# Patient Record
Sex: MALE | Race: WHITE
[De-identification: names, ages, dates, MRNs, and addresses within clinical notes are randomized per-mention and may not be internally consistent; named-entity substitution may affect disease eponyms.]

---

## 2017-05-28 ENCOUNTER — HOSPITAL ENCOUNTER (EMERGENCY)
Dept: HOSPITAL 80 - CED | Age: 72
Discharge: LEFT BEFORE DISCHARGE | End: 2017-05-28
Payer: COMMERCIAL

## 2017-05-28 DIAGNOSIS — Z53.21: Primary | ICD-10-CM

## 2018-11-06 ENCOUNTER — HOSPITAL ENCOUNTER (INPATIENT)
Dept: HOSPITAL 80 - FED | Age: 73
LOS: 14 days | Discharge: NURSING, REHAB OR LONG-TERM CARE | DRG: 216 | End: 2018-11-20
Attending: THORACIC SURGERY (CARDIOTHORACIC VASCULAR SURGERY) | Admitting: FAMILY MEDICINE
Payer: COMMERCIAL

## 2018-11-06 DIAGNOSIS — R78.81: ICD-10-CM

## 2018-11-06 DIAGNOSIS — I25.10: ICD-10-CM

## 2018-11-06 DIAGNOSIS — D69.59: ICD-10-CM

## 2018-11-06 DIAGNOSIS — B37.0: ICD-10-CM

## 2018-11-06 DIAGNOSIS — G06.1: ICD-10-CM

## 2018-11-06 DIAGNOSIS — M32.13: ICD-10-CM

## 2018-11-06 DIAGNOSIS — I34.0: ICD-10-CM

## 2018-11-06 DIAGNOSIS — M60.08: ICD-10-CM

## 2018-11-06 DIAGNOSIS — I74.8: ICD-10-CM

## 2018-11-06 DIAGNOSIS — E78.5: ICD-10-CM

## 2018-11-06 DIAGNOSIS — I10: ICD-10-CM

## 2018-11-06 DIAGNOSIS — I76: ICD-10-CM

## 2018-11-06 DIAGNOSIS — Z79.899: ICD-10-CM

## 2018-11-06 DIAGNOSIS — I33.0: Primary | ICD-10-CM

## 2018-11-06 DIAGNOSIS — E87.1: ICD-10-CM

## 2018-11-06 DIAGNOSIS — B95.4: ICD-10-CM

## 2018-11-06 DIAGNOSIS — D62: ICD-10-CM

## 2018-11-06 LAB — PLATELET # BLD: 103 10^3/UL (ref 150–400)

## 2018-11-06 PROCEDURE — P9017 PLASMA 1 DONOR FRZ W/IN 8 HR: HCPCS

## 2018-11-06 PROCEDURE — C1751 CATH, INF, PER/CENT/MIDLINE: HCPCS

## 2018-11-06 PROCEDURE — P9016 RBC LEUKOCYTES REDUCED: HCPCS

## 2018-11-06 PROCEDURE — C1768 GRAFT, VASCULAR: HCPCS

## 2018-11-06 PROCEDURE — 009U3ZX DRAINAGE OF SPINAL CANAL, PERCUTANEOUS APPROACH, DIAGNOSTIC: ICD-10-PCS | Performed by: EMERGENCY MEDICINE

## 2018-11-06 PROCEDURE — A9585 GADOBUTROL INJECTION: HCPCS

## 2018-11-06 PROCEDURE — P9035 PLATELET PHERES LEUKOREDUCED: HCPCS

## 2018-11-06 PROCEDURE — C1769 GUIDE WIRE: HCPCS

## 2018-11-06 PROCEDURE — P9041 ALBUMIN (HUMAN),5%, 50ML: HCPCS

## 2018-11-06 RX ADMIN — DOXYCYCLINE HYCLATE SCH MG: 100 CAPSULE, GELATIN COATED ORAL at 22:30

## 2018-11-06 RX ADMIN — ACETAMINOPHEN PRN MG: 325 TABLET ORAL at 21:56

## 2018-11-06 NOTE — GHP
DATE OF ADMISSION:  11/06/2018



CHIEF COMPLAINT:  Neck pain.



HISTORY OF PRESENT ILLNESS:  This is a 73-year-old male with history of lupus, on Plaquenil and mycop
henolate, who was referred to the emergency department by his orthopedic surgeon after being evaluate
d for neck pain earlier today.  Patient states that he has been having worsening neck pain over the p
ast week.  He went to see his orthopedic surgeon, where he had x-rays done that did not reveal a caus
e for his pain.  He then developed some pretty significant chills and was sent to the emergency depar
tment for further evaluation.  During the time of my exam, he denies any headache.  He denies any fev
ers.  He does feel chilled.  Denies any new numbness, weakness.



PAST MEDICAL HISTORY:  

1.  SLE.  

2.  Possible Lyme disease.  

3.  Hypertension.



PAST SURGICAL HISTORY:  Hamstring repair in 2014, right shoulder surgery.



HOME MEDICATIONS:  Amlodipine, ramipril, Plaquenil, Lipitor, doxycycline, aspirin, mycophenolate.



ALLERGIES:  Contrast dye.



SOCIAL HISTORY:  The patient is a  for [employer].  Denies any alcohol, tobacco, or illicit drug use.




FAMILY HISTORY:  Reviewed and noncontributory.



REVIEW OF SYSTEMS:  Comprehensive 10-point review of systems was done and is negative, except as ment
ioned in HPI.



PHYSICAL EXAM:  VITAL SIGNS:  Blood pressure 121/70; pulse of 84; respiratory rate 18; O2 sat 88% on 
room air, 94% on 2 L.  GENERAL:  No acute distress.  HEAD:  Normocephalic, atraumatic.  NECK:  Rigid.
  CARDIOVASCULAR:  S1, S2.  No murmurs, rubs, clicks, gallops.  No JVD.  No lower extremity edema.  P
ULMONARY:  Lungs are clear.  No wheezes, rales, or rhonchi.  ABDOMEN:  Soft, nontender, nondistended.
  No guarding or rebound tenderness.  Normoactive bowel sounds.  EXTREMITIES:  No clubbing or cyanosi
s.  NEURO:  Cranial nerves 2 through 12 grossly intact.  No focal motor or sensory deficits.  SKIN:  
There is hyperpigmentation of the legs attributed to Plaquenil use.



DIAGNOSTICS:  WBC 10.76, hemoglobin 11.7, hematocrit 33.7, platelets 103.  Sodium 132, potassium 4.5,
 chloride 96, BUN 16, creatinine 0.6, glucose 175.  Influenza was negative.  CSF was reviewed showing
 19 WBCs 23% neutrophils.  Protein was high at 68% with a glucose of 71.



ASSESSMENT AND PLAN:  

1.  This is a 73-year-old immunosuppressed male on mycophenolate and Plaquenil presenting with neck s
tiffness, most likely due to aseptic meningitis.  

a.  Plan:  The patient will be admitted to the medical-surgical floor.  I discussed the case with Dr. Waters in the emergency department, who discussed the case with Dr. Carmelo Nieto of Infectious Disease, 
who recommended ampicillin, ceftriaxone, and vancomycin, which will be continued until his cultures a
re resulted.  

2.  History of systemic lupus erythematosus.  

3.  History of hypertension.  

a.  Plan is to continue home medications.  

4.  Mild anemia, some signs of bleeding.  

5.  Mild hyponatremia.  

a.  Plan:  Will continue to monitor serum sodium.  

6.  Patient will be admitted to the hospital under inpatient status.





Job #:  [id number]

## 2018-11-06 NOTE — EDPHY
H & P


Stated Complaint: Lower back pain


Time Seen by Provider: 11/06/18 16:44


HPI/ROS: 





Chief Complaint:  Neck pain, back pain, fever





HPI:  73-year-old male with a history of lupus on multiple immunosuppressants 

including cellcept and plaquenil is presenting with neck pain stiffness and 

some subjective chills at home.  He has a history of pinched nerves is neck is 

initially thought this was this.  He has had the symptoms for couple weeks.  

The last 3 days he has had worsening stiffness in his neck and back.  This is 

not similar to prior lupus flares.  He was seen in his orthopedist office who 

sent him in for concerns about a possible meningitis.  X-rays at orthopedist 

were negative.  He has been neurologically intact.  Patient states he did not 

feel well enough to go to work today which is very unusual.  Mild headache.  No 

nausea or vomiting.





ROS:  10 systems were reviewed and were negative except those elements noted in 

the HPI.





PMH:  SLE, on multiple immunosuppressants including Cellcept and Plaquenil





Social History: No smoking, no alcohol,  no recreational drug use





Family History: non-contributory





Physical Exam:


Gen: Awake, Alert, No Distress, warm to touch


HEENT:  


     Nose: no rhinorrhea


     Eyes: PERRLA, EOMI


     Mouth: Moist mucosa 


Neck:  Stiff neck, positive meningismus, no JVD


Chest: nontender, lungs clear to auscultation


Heart: S1, S2 normal, no murmur


Abd: Soft, non-tender, no guarding


Back: no CVA tenderness, no midline tenderness 


Ext: no edema, non-tender


Skin: no rash


Neuro: CN II-XII intact, Sensation grossly intact, Strength 5/5 in bilateral 

upper and lower extremities








- Personal History


Current Tetanus/Diphtheria Vaccine: Yes





- Medical/Surgical History


Hx Asthma: No


Hx Chronic Respiratory Disease: Yes


Hx Diabetes: No


Hx Cardiac Disease: Yes


Hx Renal Disease: No


Hx Cirrhosis: No


Hx Alcoholism: No


Hx HIV/AIDS: No


Hx Splenectomy or Spleen Trauma: No


Other PMH: lupus in lungs,mitral valve slipage, DVT. ortho surgery, LLE tendon 

repair, R rotator cuff surgery 07/07/2015





- Social History


Smoking Status: Never smoked


Constitutional: 


 Initial Vital Signs











Temperature (C)  37.6 C   11/06/18 16:35


 


Heart Rate  98   11/06/18 16:35


 


Respiratory Rate  18   11/06/18 16:35


 


Blood Pressure  130/84 H  11/06/18 16:35


 


O2 Sat (%)  95   11/06/18 16:35








 











O2 Delivery Mode               Room Air














Allergies/Adverse Reactions: 


 





CONTRAST DYE Allergy (Intermediate, Uncoded 11/06/18 16:40)


 Rash








Home Medications: 














 Medication  Instructions  Recorded


 


Amlodipine Besylate  07/04/14


 


Aspirin 81mg (OTC)  07/04/14


 


Doxycycline Calcium  07/04/14


 


Lipitor 40 mg (RX)  07/04/14


 


Plaquenil 200 mg (RX)  07/04/14


 


Ramipril  07/04/14














Medical Decision Making


Procedures: 





Procedure:  Lumbar puncture.





Indication:  Possible meningitis





After verbal informed consent from patient explaining the risks including 

infection, bleeding, and neurologic damage, a lumbar puncture was performed 

after the patient was prepped and draped in the usual fashion.


The back was anesthetized with 1% lidocaine.  Approximately 4 cc of clear fluid 

was obtained.  Opening pressure was not obtained.  There were no complications.


The procedure was performed by myself.


ED Course/Re-evaluation: 





73-year-old with meningismus and immunosuppression.  CSF is concerning.  I have 

discussed with Dr. Nieto, infectious Disease.  He is requesting that the patient 

receive vancomycin 1.5 g q.12 hours, ceftriaxone 2 g q.12 hours, and ampicillin 

2 g q.4 hours.  He is also requesting enterovirus and HSV.  These have been 

ordered by me.  Case discussed with the hospitalist.  Will admit to their 

service for further care.  Patient is awake alert and currently without 

complaint.





- Data Points


Laboratory Results: 


 Laboratory Results





 11/06/18 17:05 





 11/06/18 17:05 





 











  11/06/18 11/06/18 11/06/18





  18:28 17:30 17:05


 


WBC      





    


 


RBC      





    


 


Hgb      





    


 


Hct      





    


 


MCV      





    


 


MCH      





    


 


MCHC      





    


 


RDW      





    


 


Plt Count      





    


 


MPV      





    


 


Neut % (Auto)      





    


 


Lymph % (Auto)      





    


 


Mono % (Auto)      





    


 


Eos % (Auto)      





    


 


Baso % (Auto)      





    


 


Nucleat RBC Rel Count      





    


 


Absolute Neuts (auto)      





    


 


Absolute Lymphs (auto)      





    


 


Absolute Monos (auto)      





    


 


Absolute Eos (auto)      





    


 


Absolute Basos (auto)      





    


 


Absolute Nucleated RBC      





    


 


Immature Gran %      





    


 


Immature Gran #      





    


 


RBC/WBC/PLT Morphology      





    


 


Platelet Estimate      





    


 


Sodium      132 mEq/L L mEq/L





     (135-145) 


 


Potassium      4.5 mEq/L mEq/L





     (3.3-5.0) 


 


Chloride      96 mEq/L L mEq/L





     () 


 


Carbon Dioxide      23 mEq/l mEq/l





     (22-31) 


 


Anion Gap      13 mEq/L mEq/L





     (6-14) 


 


BUN      16 mg/dL mg/dL





     (7-23) 


 


Creatinine      0.6 mg/dL L mg/dL





     (0.7-1.3) 


 


Estimated GFR      > 60 





    


 


Glucose      175 mg/dL H mg/dL





     () 


 


Calcium      8.7 mg/dL mg/dL





     (8.5-10.4) 


 


Fl Pathologist Review    Pending   





    


 


CSF Tube Number    4   





    


 


CSF Appearance    CLEAR   





    (CLEAR)  


 


CSF Color    COLORLESS   





    (COLORLESS)  


 


CSF Supernatant    COLORLESS   





    (COLORLESS)  


 


CSF WBC    19 /mm3 H /mm3  





    (0-5)  


 


CSF RBC    2 /mm3 H /mm3  





    (0-0)  


 


CSF Neutrophils %    23 % H %  





    (0-6)  


 


CSF Lymphocytes %    47 % %  





    (0-100)  


 


CSF Monos/Macrophage %    30 % %  





    (0-45)  


 


CSF Glucose    71 mg/dL mg/dL  





    (50-75)  


 


CSF Total Protein    68 mg/dL H mg/dL  





    (12-60)  


 


Nasal Influenza A PCR  Pending     





    


 


Nasal Influenza B PCR  Pending     





    














  11/06/18





  17:05


 


WBC  10.76 10^3/uL H 10^3/uL





   (3.80-9.50) 


 


RBC  3.48 10^6/uL L 10^6/uL





   (4.40-6.38) 


 


Hgb  11.7 g/dL L g/dL





   (13.7-17.5) 


 


Hct  33.7 % L %





   (40.0-51.0) 


 


MCV  96.8 fL fL





   (81.5-99.8) 


 


MCH  33.6 pg pg





   (27.9-34.1) 


 


MCHC  34.7 g/dL g/dL





   (32.4-36.7) 


 


RDW  12.1 % %





   (11.5-15.2) 


 


Plt Count  103 10^3/uL L 10^3/uL





   (150-400) 


 


MPV  10.3 fL fL





   (8.7-11.7) 


 


Neut % (Auto)  92.3 % H %





   (39.3-74.2) 


 


Lymph % (Auto)  3.1 % L %





   (15.0-45.0) 


 


Mono % (Auto)  4.1 % L %





   (4.5-13.0) 


 


Eos % (Auto)  0.0 % L %





   (0.6-7.6) 


 


Baso % (Auto)  0.1 % L %





   (0.3-1.7) 


 


Nucleat RBC Rel Count  0.0 % %





   (0.0-0.2) 


 


Absolute Neuts (auto)  9.93 10^3/uL H 10^3/uL





   (1.70-6.50) 


 


Absolute Lymphs (auto)  0.33 10^3/uL L 10^3/uL





   (1.00-3.00) 


 


Absolute Monos (auto)  0.44 10^3/uL 10^3/uL





   (0.30-0.80) 


 


Absolute Eos (auto)  0.00 10^3/uL L 10^3/uL





   (0.03-0.40) 


 


Absolute Basos (auto)  0.01 10^3/uL L 10^3/uL





   (0.02-0.10) 


 


Absolute Nucleated RBC  0.00 10^3/uL 10^3/uL





   (0-0.01) 


 


Immature Gran %  0.4 % %





   (0.0-1.1) 


 


Immature Gran #  0.04 10^3/uL 10^3/uL





   (0.00-0.10) 


 


RBC/WBC/PLT Morphology  TNP 





  


 


Platelet Estimate  TNP 





  


 


Sodium  





  


 


Potassium  





  


 


Chloride  





  


 


Carbon Dioxide  





  


 


Anion Gap  





  


 


BUN  





  


 


Creatinine  





  


 


Estimated GFR  





  


 


Glucose  





  


 


Calcium  





  


 


Fl Pathologist Review  





  


 


CSF Tube Number  





  


 


CSF Appearance  





  


 


CSF Color  





  


 


CSF Supernatant  





  


 


CSF WBC  





  


 


CSF RBC  





  


 


CSF Neutrophils %  





  


 


CSF Lymphocytes %  





  


 


CSF Monos/Macrophage %  





  


 


CSF Glucose  





  


 


CSF Total Protein  





  


 


Nasal Influenza A PCR  





  


 


Nasal Influenza B PCR  





  











Microbiology Results: 


 MICROBIOLOGY





11/06/18 17:30   Cerebral Spinal Fluid   Gram Stain - Final





Medications Given: 


 








Discontinued Medications





Sodium Chloride (Ns)  1,000 mls @ 0 mls/hr IV EDNOW ONE; Wide Open


   PRN Reason: Protocol


   Stop: 11/06/18 17:48


   Last Admin: 11/06/18 17:48 Dose:  1,000 mls








Departure





- Departure


Disposition: [facility] Inpatient Acute


Condition: Fair


Instructions:  Bacterial Meningitis (ED)


Referrals: 


MAI SANTIAGO [Primary Care Provider] - As per Instructions

## 2018-11-07 LAB — PLATELET # BLD: 83 10^3/UL (ref 150–400)

## 2018-11-07 RX ADMIN — RAMIPRIL SCH MG: 5 CAPSULE ORAL at 09:30

## 2018-11-07 RX ADMIN — DOXYCYCLINE HYCLATE SCH MG: 100 CAPSULE, GELATIN COATED ORAL at 09:31

## 2018-11-07 RX ADMIN — AMPICILLIN SODIUM SCH MLS: 2 INJECTION, POWDER, FOR SOLUTION INTRAMUSCULAR; INTRAVENOUS at 05:24

## 2018-11-07 RX ADMIN — ASPIRIN SCH MG: 81 TABLET, DELAYED RELEASE ORAL at 09:31

## 2018-11-07 RX ADMIN — ATORVASTATIN CALCIUM SCH MG: 40 TABLET, FILM COATED ORAL at 21:27

## 2018-11-07 RX ADMIN — DOXYCYCLINE HYCLATE SCH MG: 100 CAPSULE, GELATIN COATED ORAL at 21:27

## 2018-11-07 RX ADMIN — AMPICILLIN SODIUM SCH MLS: 2 INJECTION, POWDER, FOR SOLUTION INTRAMUSCULAR; INTRAVENOUS at 10:49

## 2018-11-07 RX ADMIN — ACETAMINOPHEN PRN MG: 325 TABLET ORAL at 05:23

## 2018-11-07 RX ADMIN — AMPICILLIN SODIUM SCH MLS: 2 INJECTION, POWDER, FOR SOLUTION INTRAMUSCULAR; INTRAVENOUS at 02:06

## 2018-11-07 NOTE — PCMIDPN
Assessment/Plan: 


Please see my consultation earlier today for full details.





Assessment/Plan:


* C 1-2 prevertebral abscess with concomitant epidural inflammatory change:  

MRI shows evidence of prevertebral abscess with mild epidural inflammatory 

change.  Likely associated with streptococcal bacteremia.  Will obtain both ENT 

and neurosurgical consultations based on this finding.  Patient with persistent 

left hip and pelvic pain.  Will proceed with MRI of left hip and pelvis to 

assess for other foci of infection in the setting of bacteremia.  Continue 

ceftriaxone 2 g IV q.12 hours.


* CSF pleocytosis:  CSF meningoencephalitis panel at [facility] is negative.  

Suspect pleocytosis likely represents parameningeal inflammatory process from 

prevertebral abscess and anterior epidural inflammatory change.








11/07/18 17:42





Objective: 


 Vital Signs











Temp Pulse Resp BP Pulse Ox


 


 36.6 C   78   18   140/69 H  92 


 


 11/07/18 15:58  11/07/18 15:58  11/07/18 15:58  11/07/18 15:58  11/07/18 15:58








 Laboratory Results





 11/07/18 04:51 





 11/07/18 04:51 





 











 11/06/18 11/07/18 11/08/18





 05:59 05:59 05:59


 


Intake Total  1600 


 


Output Total  325 


 


Balance  1275 














ICD10 Worksheet


Patient Problems: 


 Problems











Problem Status Onset


 


Meningitis Acute

## 2018-11-07 NOTE — GCON
INFECTIOUS DISEASE CONSULTATION



DATE OF CONSULTATION:  11/07/2018



REFERRING PHYSICIAN:  Shahbaz Hooper DO



REASON FOR CONSULTATION:  Meningitis.



HISTORY OF PRESENT ILLNESS:  Patient is a 73-year-old male with a past medical history of lupus on ch
ronic Plaquenil and CellCept who I am asked to see in consultation for meningitis.  Patient describes
 approximately 10 days ago that he was pushing a sled at the [facility] after which he developed neck pain.
  This pain had become progressive and was associated with neck stiffness.  Over the last 2 days, he 
has had concomitant rigors, but does not feel like he has had any fever.  He also has developed left-
sided flank pain.  He has had mild associated headache, but this has not been severe.  Yesterday, he 
was seen by his orthopedic surgeon and recommended further evaluation in the emergency department. 



In the emergency department, he was noted to have a mild leukocytosis and mild thrombocytopenia with 
left shift.  Given his neck pain, a lumbar puncture was performed, which showed 19 white blood cells 
with 23% neutrophils, 47% lymphocytes, 30% monocytes, glucose 71, protein 68 with negative gram stain
.  I was contacted based on his CSF findings and recommended empiric vancomycin, ampicillin, and ceft
riaxone pending further data given his underlying immune suppression.  Blood cultures were obtained a
nd now 1 of 2 sets is showing growth of a streptococcal species, which by BCID is not group A, group 
B, or Streptococcus pneumoniae.  Patient has been chronically on doxycycline for many years related t
o his underlying lupus and prior diagnosis of Lyme disease.  He has not experienced nausea, vomiting,
 or diarrhea.  No recent skin injuries.  No recent dental problems or dental work.  The patient trave
ls frequently on a domestic basis.  Travel has included [place].  Given the above findings, I am now 
asked to assist in his ongoing management.



PAST MEDICAL HISTORY:  Lupus, hypertension, hyperlipidemia, hypercoagulability, Lyme disease.



PAST SURGICAL HISTORY:  Ruptured hamstring repair, rotator cuff surgery.



CURRENT MEDICATIONS:  Vancomycin 1.25 g IV q.8 hours, ampicillin 2 g IV q.4 hours, ceftriaxone 2 g IV
 q.12 hours, Norvasc 5 mg p.o. daily, aspirin 81 mg p.o. daily, Lipitor 40 mg p.o. q.h.s., doxycyclin
e 50 mg p.o. b.i.d., ramipril 5 mg p.o. daily.



ALLERGIES:  CT contrast dye causes rash; patient does not think he has had any difficulty with MRI co
ntrast dye.



SOCIAL HISTORY:  Patient does not smoke.  He drinks alcohol twice weekly.  No drug use.  Travels freq
uently throughout [facility], including [place], [place], [place], [place], [place].  No animal 
exposures.



REVIEW OF SYSTEMS:  Outside that noted in the HPI, the remainder of 10-system review is unremarkable.




FAMILY HISTORY:  Hypertension.



PHYSICAL EXAMINATION:  VITAL SIGNS:  Temperature maximum 37.8, temperature current 36.7, heart rate 8
1, respiratory rate 18, blood pressure 119/64.  GENERAL:  Patient is well nourished, well developed i
n no acute distress.  He appears nontoxic.  HEENT:  There is no scleral icterus, conjunctival injecti
on, or conjunctival petechiae.  Oropharynx shows dry mucous membranes.  Dentition is in fair repair. 
 There is no nasal discharge.  There is no tenderness over the frontal, maxillary, or mastoid area.  
NECK:  There is point tenderness over the cervical spine.  There is tenderness with flexion of the ce
rvical spine.  No lymphadenopathy or palpable thyromegaly.  CHEST:  Clear to auscultation bilaterally
 without adventitious sounds.  The respiratory effort is normal.  CARDIOVASCULAR:  Regular rate and r
hythm with a 2/6 systolic murmur heard throughout.  No gallops or rubs noted.  ABDOMEN:  Soft, nonten
holly, nondistended.  There is no palpable organomegaly.  Bowel sounds are present.  BACK:  There is no
 flank tenderness.  MUSCULOSKELETAL:  There is no tenderness over the left buttock or thigh.  There i
s no edema present.  No cyanosis or clubbing present.  Finger joint deformities present.  SKIN:  Ther
e is violaceous hyperpigmentation bilaterally over the lower extremities which is chronic; no stigmat
a of endocarditis.  Skin is warm and dry to touch.  NEUROLOGIC:  The patient is alert and interacts a
ppropriately with examiner.  Cranial nerves 2-12 are grossly intact.  Sensation is grossly intact.  M
uscle tone and bulk are normal.  LYMPHATICS:  No cervical or supraclavicular nodes.



LABORATORY DATA:  White blood cell count 9.6, hematocrit 30.7, platelets 83, neutrophils 89%.  Serum 
creatinine is 0.7, bicarbonate 23.  CSF showing 19 white blood cells, 2 red blood cells, 23% neutroph
ils, 47% lymphocytes, 30% monocytes, glucose 71, protein 68.  Influenza PCR is negative.  CSF meningo
encephalitis PCR panel was pending at [facility].  CSF Gram stain is negative with culture p
ending.  Blood cultures showing 1 of 2 sets with Streptococcus species not identified as group A, B, 
or pneumococcus by PCR testing.



IMPRESSION:  

1.  Meningitis:  Given the patient's presentation primarily with neck pain and rigors, I have concern
s this may represent a parameningeal inflammatory process, particularly in light of patient's concomi
tant bacteremia.  Patient does not have headache as a prominent symptom as would typically be expecte
d with meningitis.  Given the patient's immunosuppression, he is at risk for meningitis, including op
portunistic pathogens.  Currently, he has a meningoencephalitis panel pending at [facility].
  Given the positive blood culture, suspect other pathogen, such as Listeria or Streptococcus pneumon
iae are unlikely.  Patient will need MRI of the cervical spine to further assessed for process, such 
as diskitis, vertebral osteomyelitis, or epidural abscess.  Viral etiologies or fungi, such as Crypto
coccus or Coccidioides would be in the differential, although I suspect fungal etiology will be unlik
ely.

2.  Bacteremia:  Most likely, this will be either an oropharyngeal strep versus streptococcal species
, such as group C or G.  Will continue ceftriaxone at doses that penetrate cerebrospinal fluid pendin
g further evaluation.

3.  Flank pain:  Unclear etiology.  No real findings on exam.  If this persists, may ultimately need 
further imaging of this area to ensure no evidence of a focal process, such as psoas abscess.

4.  Thrombocytopenia:  No prior lab values to assess if this is related to his underlying lupus or co
mponent of his infectious presentation.

5.  Reported history of Lyme disease:  Discussed briefly with patient that chronic Lyme disease is no
t felt to be a distinct entity.



RECOMMENDATIONS:  

1.  Continue ceftriaxone 2 g IV q.12 hours.

2.  Discontinue vancomycin and ampicillin.

3.  MRI of the cervical spine with IV contrast.

4.  Await CSF meningoencephalitis panel from [facility].

5.  Follow up blood culture identification and susceptibility as available.

6.  If flank pain persists, consider CT scan of abdomen and pelvis versus pelvic MRI to assess for et
iology.

7.  Followup CSF Gram stain and culture as available.

8.  Further diagnostic testing based on above evaluation and clinical course.  



Thank you for this consultation.  We will continue to follow the patient with you.





Job #:  [id number]

## 2018-11-07 NOTE — HOSPPROG
Hospitalist Progress Note


Assessment/Plan: 





74yo M with SLE on immunosuppressants presents with several days of chills and 

worsening neck pain found to have meningitis and bacteremia. 





#Meningitis: Mild pleocytosis with increased neutrophils on CSF, gram stain 

negative for organisms. He is bacteremic as below and we will target treatment 

at that for now. Reports significant neck discomfort that warrants further 

evaluation. Otherwise, neurologically intact. He is at risk for opportunistic 

infections.


   - Meningoencephalitis panel pending, follow up CSF cultures


   - CTX 2g IV q12h


   - MRI cervical spine


   


#Streptococcal bacteremia: Not group A, B, or pneumococcus. Possibly viridans. 

No clear oropharyngeal or GI source. He is not septic. 


   - Antibiotics and imaging as above


   - Consider TTE


   - Will need repeat blood cultures to eval for clearance





#Thrombocytopenia: Suspect medication induced. Not floridly septic to suspect 

consumption. No prior for comparison. Monitor daily.





#Hyponatremia, mild: C/w hypovolemia. IVF and monitor.





#Transaminitis: Likely medication (plaq, MMF) related. No e/o biliary 

obstruction. Trend. 





#SLE: No acute flare. Holding plaquenil, mycophenolate until infection 

controlled but should be restarted soon.





#Chronic resp insufficiency: On baseline 2L. 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 

Followed at [facility]. 





#Pulmonary hypertension: RVSP 35 on 2015 TTE.





#CAD: No stent placement. Cont asa/statin.





#HTN: Home meds.





Dispo: Remain inpatient for management of above with IV antibiotics, further 

diagnostic studies. 


Subjective: Overall feeling ok, still with neck discomfort. Denies oral lesions/

infection, GI issues as of late. No fevers since admission.


Objective: 


 Vital Signs











Temp Pulse Resp BP Pulse Ox


 


 36.6 C   71   14   120/65   88 L


 


 11/07/18 12:00  11/07/18 12:00  11/07/18 12:00  11/07/18 12:00  11/07/18 12:00








 Laboratory Results





 11/07/18 04:51 





 11/07/18 04:51 





 











 11/06/18 11/07/18 11/08/18





 05:59 05:59 05:59


 


Intake Total  1600 


 


Output Total  325 


 


Balance  1275 














- Physical Exam


Constitutional: no apparent distress, appears nourished, not in pain


Eyes: PERRL, anicteric sclera, EOMI


Ears, Nose, Mouth, Throat: moist mucous membranes, hearing normal, ears appear 

normal, no oral mucosal ulcers


Cardiovascular: regular rate and rhythym, no murmur, rub, or gallop, other (

prominent S2)


Respiratory: no respiratory distress, other (occasional dry cough (chronic))


Gastrointestinal: normoactive bowel sounds, soft, non-tender abdomen, no 

palpable masses


Genitourinary: no bladder fullness, no bladder tenderness, no renal bruits


Skin: other (hyperpigmentation of BLE)


Musculoskeletal: full muscle strength, no muscle tenderness, normal joint ROM


Neurologic: AAOx3, sensation intact bilaterally


Psychiatric: interacting appropriately, not anxious, not encephalopathic, 

thought process linear





ICD10 Worksheet


Patient Problems: 


 Problems











Problem Status Onset


 


Meningitis Acute  














- ICD10 Problem Qualifiers


(1) Meningitis

## 2018-11-07 NOTE — ASMTCMCOM
CM Note

 

CM Note                       

Notes:

Pt is a 72 y/o man admitted for meningitis. Pt has a hx of lupus, plaquenil and mycophenolate. ID 

has been consulted. Needs are TBD at this time. CM to follow.







Plan: TBD

 

Date Signed:  11/07/2018 12:15 PM

Electronically Signed By:PATRICE Sheldon

## 2018-11-07 NOTE — GCON
DATE OF CONSULTATION:  11/07/2018



HISTORY OF PRESENT ILLNESS:  The patient is a 73-year-old gentleman with a 
history of lupus, who is on Plaquenil and CellCept.  He was admitted to the 
hospital yesterday for possible meningitis.  He had had 10 days of neck pain 
prior to admission with associated neck stiffness.  He also feels that his 
right ear is slightly full feeling.  We were consulted because of a small C1-C2 
prevertebral abscess found on imaging.  The patient is currently being seen by 
Dr. Carmelo Nieto, and antibiotics have been started.  Cell counts obtained showed 
leukocytosis and mild thrombocytopenia.



PAST MEDICAL HISTORY:  Significant for lupus, hypertension, hyperlipidemia, 
hypercoagulability, and Lyme disease.



PAST SURGICAL HISTORY:  A hamstring repair and rotator cuff surgery.



CURRENT MEDICATIONS:  Include vancomycin, ampicillin, and ceftriaxone as well 
as doxycycline plus is normal outpatient medications.



ALLERGIES:  To CT contrast dye.



SOCIAL HISTORY:  Patient is a nonsmoker.



PHYSICAL EXAMINATION:  GENERAL:  Patient is alert and orientated, in no acute 
distress.  HEAD:  Atraumatic, normocephalic.  EARS:  EACs were clear.  TMs were 
healthy and intact.  NOSE:  Clear.  ORAL CAVITY:  Oropharynx with severe dry 
mouth.  NECK:  Supple without any adenopathy.



IMAGING:  Review of his MRI shows a 3 x 0.5 cm perivertebral abscess at the 
level of C1-C2.  This was reviewed with Dr. Mederos.



ASSESSMENT AND PLAN:  Patient with a small prevertebral abscess at C1-C2.  
Given the size, I feel that antibiotic treatment should be sufficient.  Should 
anything worsen, please contact us.  



This case has been reviewed with Dr. Daquan Mederos.





I evaluated the images personally and the area of concern is very thin.  There 
is not likely a drainable fluid collection.  Given the site and difficulty in 
accessing the area, I feel it best to continue to  treat with IV antibiotics, 
and re-evaluate if there is not response to the treatment.

thank you,

Daquan Mederos MD

Job #:  [id number]

MTDD

## 2018-11-07 NOTE — GCON
NEUROSURGICAL CONSULTATION





CHIEF COMPLAINT:  Neck pain and stiffness.



HISTORY OF PRESENT ILLNESS:  Mr. Godinez is a 73-year-old male with a history of lupus, for which 
he is on chronic Plaquenil and CellCept.  He was working out at the [employer] several days ago when he dev
eloped neck pain.  Thereafter, he developed neck stiffness and rigors.  He was evaluated in the emerg
ency department, and his symptoms were suspicious for meningitis.  An LP was obtained which showed 19
 white cells, and he was started on empiric vancomycin, ampicillin, and ceftriaxone.  The blood cultu
res were subsequently positive for streptococcal species.  An MRI of the cervical spine was obtained.
  This demonstrated some prevertebral soft tissue swelling from the clivus down to C3, and neurosurgi
long consultation was requested.  The patient currently complains of ongoing neck pain and stiffness. 
 He does feel better than he did yesterday.  He denies any upper extremity radicular pain, weakness, 
paresthesias, bowel/bladder problems, or ataxia.



PAST MEDICAL HISTORY:  

1.  Lupus.

2.  Hypertension.

3.  Hyperlipidemia.

4.  Hypercoagulability.

5.  Lung disease.



PAST SURGICAL HISTORY:  Includes: 

1.  Ruptured hamstring repair.

2.  Rotator cuff surgery.



MEDICATIONS:  Prior to admission are ramipril, doxycycline, Lipitor, aspirin, Norvasc.  He is current
ly on vancomycin, ampicillin, and ceftriaxone.



ALLERGIES:  CT contrast dye, which causes a rash.



FAMILY HISTORY:  Patient has no family history of spine problems.



SOCIAL HISTORY:  Patient is  with grown children.  He does not smoke, but does drink alcohol a
pproximately twice weekly.  He denies recreational drug use.



REVIEW OF SYSTEMS:  Negative.



PHYSICAL EXAM:  GENERAL:  The patient is a 73-year-old male lying in bed in a mild amount of distress
.  HEAD, EYES, EARS, NOSE, AND THROAT:  Negative to drainage.  EXTREMITIES:  Pink, warm, and dry.  NE
UROLOGICAL:  Patient is awake, alert, oriented x4.  Pupils equal, round, reactive to light.  Extraocu
lar motions are intact.  There is no evidence of facial droop.  Tongue and uvula are midline.  Spinal
 access muscles are intact.  His motor strength is 5/5 in all muscle groups of his upper and lower ex
tremities bilaterally.  Sensation is grossly intact to light touch in his upper and lower extremities
 bilaterally.  Deep tendon reflexes are 1/4 in the bilateral biceps, triceps, brachioradialis, patell
ar, and Achilles.  There is a negative Randell's with no clonus.



DIAGNOSTIC STUDIES:  An MRI of the cervical spine from [facility] on 11/07/2018,
 shows preservation of the sagittal alignment.  There are moderate multilevel degenerative changes.  
There is some prevertebral soft tissue enhancement and swelling that extends from the anterior portio
n of C4 through C1 and toward the clivus.  This also extends laterally toward the lateral masses of C
1 bilaterally.



IMPRESSION:  This is a 73-year-old male with neck pain, rigors, who has meningitis and a streptococca
l bacteremia.  His MRI shows a prevertebral soft tissue swelling enhancement from C4 through the cliv
us, but he does not have any evidence of an epidural abscess at this point in time.  He is neurologic
ally stable.



PLAN:  All the above discussed in detail with the patient and his wife.  This patient was seen and ex
amined with Dr. Dial.  At this point time, the patient does feel slightly better since he ha
s been on the IV antibiotics.  This prevertebral soft tissue swelling does not appear to involve any 
type of epidural abscess so we will follow him on a conservative basis.  Should he have persistent fe
vers or bacteremia, then we will consider a repeat MRI of the cervical spine with possible surgical i
ntervention.  For now, we will follow him and his progress.  He may benefit from some p.o. muscle rel
axant medications for his ongoing neck stiffness.  Please call with any neurologic neurological pandey
ginette.





Job #:  [id number]

## 2018-11-08 LAB — PLATELET # BLD: 92 10^3/UL (ref 150–400)

## 2018-11-08 RX ADMIN — DOXYCYCLINE HYCLATE SCH MG: 100 CAPSULE, GELATIN COATED ORAL at 07:49

## 2018-11-08 RX ADMIN — ASPIRIN SCH MG: 81 TABLET, DELAYED RELEASE ORAL at 07:50

## 2018-11-08 RX ADMIN — ATORVASTATIN CALCIUM SCH MG: 40 TABLET, FILM COATED ORAL at 21:28

## 2018-11-08 RX ADMIN — ACETAMINOPHEN PRN MG: 325 TABLET ORAL at 05:15

## 2018-11-08 RX ADMIN — DOXYCYCLINE HYCLATE SCH MG: 100 CAPSULE, GELATIN COATED ORAL at 21:28

## 2018-11-08 RX ADMIN — RAMIPRIL SCH MG: 5 CAPSULE ORAL at 07:50

## 2018-11-08 NOTE — ECHO
[email or web address]





[address] [address] 

Main: [phone] 



Fax: 



Transthoracic Echocardiogram 

Name:             TYRELL GREEN                    MR#:

[id number]

Study Date:       2018                           Study Time:

10:09 AM

YOB: 1945                           Age:

73 year(s)

Height:           175.3 cm (69 in.)                    Weight:

78.47 kg (173 lb.)

BSA:              1.94 m2                              Gender:

Male

Examination:      Echo                                 Indication:

Streptococcal bactermia, prevertebral



abscess, Eval for endocarditis 

Image Quality:                                         Contrast: 

Requested by:     Carina Diaz                        BP:

118 mmHg/66 mmHg

Heart Rate:                                            Rhythm: 

Indication:       Streptococcal bactermia, prevertebral abscess, Eval

for endocarditis



Procedure Staff 

Ultrasound Technician:   Ramirez Henderson RDCS 

Reading Physician:       Reza Huggins MD 

Requesting Provider: 



Conclusions:           Normal size left ventricle.  

Normal global systolic LV function.  

EF is 66 %.  

The left atrium is moderately dilated.  

There is posterior mitral anular calcification. The posterior mitral

valve leaflet is thickened with

reduced motion. There is moderate mitral regurgitation. Can not

exclude a vegetation on this valve..

There is mild thickening of the aortic cusps.  

No aortic valve stenosis is present.  

There is no aortic valve regurgitation.  

Right Ventricular systolic pressure is measured at 62 mmHg.  

When compared to the 18 study. Pulmonary hypertension is now

appreciated. Aortic and mitral

valve findings are similiar. Consdier ANAMARIA to further evaluate the

patients condition given aortic and

mitral valve are difficult to evaluate given calcification and leaflet

thickening.



Measurements: 

Chambers                     Valvular Assessment AV/MV

Valvular Assessment TV/PV



Normal                                    Normal

Normal

Name         Value      Range              Name         Value Range

Name           Value Range

Ao Heidi (MM): 3.9 cm     (2.2 cm-3.7            AV Vmax:     1.86 m/s

(1 m/s-1.7        TR Vmax:       3.76 mm/s ( - )



cm)                                   m/s)             TR PGmax:

57 mmHg ( - )

IVSd (2D):   1.0 cm (0.6 cm-1.1                AV maxP mmHg (

- )          syst. PAP: 62 mmHg   ( - )



cm)                AV meanP mmHg ( - )           PV Vmax:

0.81 m/s (0.6 m/s-0.9

LVDd (2D):   5.1 cm     (4.2 cm-5.9            ADITI (VTI):   1.9 cm ( -

)                                m/s)



cm)                MV E Vmax:   1.22 m/s ( - )         PV PGmax:

3  mmHg ( - )

LVDs (2D):   3.3 cm     (2.1 cm-4              MV A Vmax:   0.67 m/s (

- )



cm)                MV E/A:      1.82  ( - )  

LVPWd (2D):  1.1 cm     (0.6 cm-1 



cm)                    MV meanPG:   3 mmHg ( - )  

LVOTd        2.1 cm     2.1 cm mm              MVA (Vmax):  1.9 m/s (

- )



LVEF (2D):   66         (>=54 %)   



Patient: TYRELL GREEN                    MRN: [id number]

Study Date: 2018   Page 1 of 2

10:09 AM 









Continued Measurements: 

Chambers                      Valvular Assessment AV/MV

Valvular Assessment TV/PV



Name                       Value           Name

Value     Name                      Value

LADs:                    4.6 cm                MV Annulus:

3.6 cm      CVP (est.):             5 mmHg

LADs Lon.9 cm                MV E' Septal:

0.08  m/s

LA Area:                 26.0 cm2          MV E/E' Septal:

15.60

LA Volume:               91 ml             MV E/E' Lateral:

16.70

LA Volume Index:         46.9 ml/m2        MV VTI:

37.30 cm



MR ERO:                 0.120 cm2   

MR PISA radius:         6 mm    

MR Reg. Volume:         19 ml   

MR Reg. Fraction:       5 %    



Findings:              Left Ventricle: 

Normal size left ventricle. No LV hypertrophy. Normal global systolic

LV function. EF is 66 %.

Right Ventricle: 

Normal size right ventricle. Normal RV function.  

Left Atrium: 

The left atrium is moderately dilated.  

Right Atrium: 

The right atrium is normal in size.  

Mitral Valve: 

Moderate mitral valve regurgitation is present. There is posterior

mitral anular calcification. The

posterior mitral valve leaflet is thickened with reduced motion. There

is moderate mitral regurgitation.

Can not exclude a vegetation on this valve..  

Aortic Valve: 

The aortic valve is tri-leaflet. There is mild thickening of the

aortic cusps. No aortic valve stenosis is

present. There is no aortic valve regurgitation.  

Tricuspid Valve: 

The tricuspid valve appears normal. Moderate tricuspid regurgitation

is present. Right Ventricular

systolic pressure is measured at 62 mmHg.  

Pulmonic Valve: 

The pulmonic valve is normal in appearance and function.  

Aorta: 

The aorta is normal.  

Pericardium: 

No pericardial effusion.  

Exam Comments: 







Electronically signed by Rzea Huggins MD on 2018 at 04:26 PM 

(No Signature Object) 



Patient: TYRELL GREEN                    MRN: [id number]

Study Date: 2018   Page 2 of 2

10:09 AM 







[id number]

## 2018-11-08 NOTE — ASMTCMCOM
CM Note

 

CM Note                       

Notes:

Pt lives independently at his home with his wife, Lisa #[phone].  He is an  at [employer]. Antibiotics are presently PO.  No CM needs anticipated.  CM will follow for changes.



D/C plan:  Anticipate independent.

 

Date Signed:  11/08/2018 04:57 PM

Electronically Signed By:Indiana Yang

## 2018-11-08 NOTE — SOAPPROG
SOAP Progress Note


Assessment/Plan: 


Assessment: 74 yo M with meningitis, strep bacteremia and prevertebral soft 

tissue enhancement from clivus - C4


























Plan:


Neuro: stable and doing well overall.  no evidence of epidural abscess on 

imaging so we will follow for now


leukocytosis improved, no more fevers, on rocephin


PT/OT/ST


will continue to follow 


please call with neuro changes


discussed with Dr JANE 





11/08/18 07:34





11/08/18 07:40





Subjective: 





neck stiffness about the same, no arm pain, no weakness. 


Objective: 





 Vital Signs











Temp Pulse Resp BP Pulse Ox


 


 37.0 C   69   16   120/62   90 L


 


 11/08/18 04:00  11/08/18 04:00  11/08/18 04:00  11/08/18 04:00  11/08/18 04:00








 Laboratory Results





 11/08/18 05:00 





 11/08/18 05:24 





 











 11/07/18 11/08/18 11/09/18





 05:59 05:59 05:59


 


Intake Total 1600 650 


 


Output Total 325  


 


Balance 1275 650 








AAOx4, +FC


PERRL, EOMI, no facial droop


5/5


+ light touch





ICD10 Worksheet


Patient Problems: 


 Problems











Problem Status Onset


 


Meningitis Acute

## 2018-11-08 NOTE — HOSPPROG
Hospitalist Progress Note


Assessment/Plan: 





DIAGNOSES: 


* acute streptococcal bacteremia


* prevertebral abscess upper cervical related to above


* meningitis, question actual CSF infection verses irritation from abscess


* concern for bacterial endocarditis with significant worsening of mitral 

regurgitation and pulmonary hypertension, TTE unable to rule out vegetation


* immune suppression from medications for lupus


* chronic lupus


* Mycophenolate and Plaquenil on hold at the moment


* severe pulmonary hypertension


* valvular heart disease with mitral regurgitation


* coronary artery disease, no stents in place





PLANS:


* Continue current antibiotics; duration will depend on findings of further 

assessments and clearance of cultures


* MRI of pelvis pending


* Plan on ANAMARIA tomorrow


* Review with Cardiology, Infectious Disease





SUBJECTIVE:


Some minor improvement in his neck pain and headache and ability to move


Unchanged pain at the left hip pelvis area


Appetite OK


Rigors have diminished since admission





OBJECTIVE


Vitals reviewed:  Stable without fever so far today





Exam:


alert oriented 


I do not see evidenc of embolic events at the fingernails, fingertips, or 

intraoral mucosa


No focal neurologic changes, no confusion


skin warm dry color ok


resps not labored


lungs clear BSs


heart regular


abd soft nondistended nontender, bowel sounds present


limbs warm, no edema





iv site ok





Lab data:


Unremarkable chemistry panel other than very mild elevation of AST


White blood cell count notably better, he hemoglobin stable at 10+ platelets 

improved





Echocardiogram:


Worsened mitral regurgitation with now more severe pulmonary hypertension at 68


Unable to rule out vegetation on mitral valve by this echo





Objective: 


 Vital Signs











Temp Pulse Resp BP Pulse Ox


 


 36.3 C   66   14   148/75 H  95 


 


 11/08/18 16:00  11/08/18 16:00  11/08/18 16:00  11/08/18 16:00  11/08/18 16:00








 Laboratory Results





 11/08/18 10:00 





 11/08/18 05:24 





 











 11/07/18 11/08/18 11/09/18





 06:59 06:59 06:59


 


Intake Total 1600 650 


 


Output Total 325  


 


Balance 1275 650 














- Time Spent With Patient


Time Spent with Patient: greater than 35 minutes


Time Spent with Patient: Greater than 35 minutes spent on this patients care, 

greater than 50% of time spent counseling, educating, and coordinating care 

regarding the above mentioned plan.





ICD10 Worksheet


Patient Problems: 


 Problems











Problem Status Onset


 


Meningitis Acute

## 2018-11-09 PROCEDURE — B246ZZ4 ULTRASONOGRAPHY OF RIGHT AND LEFT HEART, TRANSESOPHAGEAL: ICD-10-PCS | Performed by: INTERNAL MEDICINE

## 2018-11-09 RX ADMIN — DOXYCYCLINE HYCLATE SCH MG: 100 CAPSULE, GELATIN COATED ORAL at 20:54

## 2018-11-09 RX ADMIN — ACETAMINOPHEN PRN MG: 325 TABLET ORAL at 20:59

## 2018-11-09 RX ADMIN — ATORVASTATIN CALCIUM SCH MG: 40 TABLET, FILM COATED ORAL at 20:53

## 2018-11-09 RX ADMIN — ASPIRIN SCH: 81 TABLET, DELAYED RELEASE ORAL at 12:43

## 2018-11-09 RX ADMIN — RAMIPRIL SCH MG: 5 CAPSULE ORAL at 07:25

## 2018-11-09 RX ADMIN — DOXYCYCLINE HYCLATE SCH MG: 100 CAPSULE, GELATIN COATED ORAL at 07:25

## 2018-11-09 NOTE — ECHO
[email or web address]





[address] [address] 

Main: [phone] 



Fax: 



Transthoracic Echocardiogram 

Name:            TYRELL GREEN                   MR#:

[id number]

Study Date:      11/09/2018                          Study Time:

12:43 PM

YOB: 1945                          Age:           73

year(s)

Height:            ( )                               Weight:

( )

BSA:                                                 Gender:

Male

Examination:     ANAMARIA                                 Indication:

Eval Mitral Valve

Image Quality:                                       Contrast: 

Requested by:    Shahbaz Hooper                          BP:

/

Heart Rate:                                          Rhythm: 

Indication:      Eval Mitral Valve 



Procedure Staff 

Ultrasound Technician:   Ramirez Henderson RDCS 

Reading Physician:       Galo Man MD 

Requesting Provider: 



Measurements: 

Chambers                   Valvular Assessment AV/MV          Valvular

Assessment TV/PV



Normal                                 Normal

Normal

Name         Value    Range             Name        Value Range

Name          Value Range



TR Vmax:      2.31 mm/s ( - )  

TR PGmax:     21 mmHg ( - )  

syst. PAP: 26 mmHg ( - )  



Continued Measurements: 

Valvular Assessment TV/PV  



Name                   Value  

CVP (est.):            5 mmHg   



Findings:              Left Ventricle: 

Normal global systolic LV function.  

Mitral Valve: 

Moderate to severe mitral regurgitation. There are two hypermobile

masses consistent with

vegetation on the anterior and posterior mitral valve leaflets..  

Aortic Valve: 

The aortic valve is tri-leaflet. There is no significant aortic valve

regurgitation. No aortic valve

stenosis is present. There is no aortic valve vegetation.  

Tricuspid Valve: 

Mild tricuspid regurgitation is present. No tricuspid valve

vegetation.

Pulmonic Valve: 

The pulmonic valve is normal in appearance and function.  

Aorta: 



Patient: TYRELL GREEN                  MRN: [id number]

Study Date: 11/09/2018   Page 1 of 2

12:43 PM 









The aorta is normal.  

Pericardium: 

No pericardial effusion. 







Electronically signed by Galo Man MD on 11/09/2018 at 05:14 PM 

(No Signature Object) 



Patient: TYRELL GREEN                  MRN: [id number]

Study Date: 11/09/2018   Page 2 of 2

12:43 PM 







[id number]

## 2018-11-09 NOTE — ASMTCMCOM
CM Note

 

CM Note                       

Notes:

Pt had cardiology work up and has vegetation on mitral valve. Sounds like they will take a medical 

approach 1st. Pt still considered independent and will dc home w/support of wife when medically 

stable. CM available for any changes.



DC Plan: Independent

 

Date Signed:  11/09/2018 03:14 PM

Electronically Signed By:Jeimy Colon RN

## 2018-11-09 NOTE — NEUSURGPN
Assessment/Plan: 





Assessment: 74 yo M with meningitis, strep bacteremia and prevertebral soft 

tissue enhancement from clivus - C4











Plan:


Neuro: stable and doing well overall.  no evidence of epidural abscess on 

imaging


Blood cx + for strep, CSF no growth to date


leukocytosis improved, no more fevers, on rocephin


PT/OT/ST


NS will sign off and follow peripherally


please call with neuro changes


discussed with Dr JANE 





Subjective: 


Pt resting in bed, feeling ok this am. Denies any neck pain, arm or leg weakness

, or radiculopathy/numbness in extremities 


Objective: 


AAOx3


NAD


VSS


MAEx4


Motor 5/5 BUE/BLE


+LT


Urinary Catheter in Place: No





- Physician


Discussed Patient with .: Jayro





Neurosurgery Physical Exam





- Vitals, I&O, Labs





 I and O











 11/08/18 11/09/18 11/10/18





 05:59 05:59 05:59


 


Intake Total 650  


 


Balance 650  


 


Intake:   


 


  Oral (ml) 500  


 


  IV Infused (ml) 150  


 


    Ampicillin Sodium 2 gm In 100  





    Ns 100 ml @ 220 mls/hr   





    IV Q4H TESS Rx#:[id number]   


 


    cefTRIAXone 2 gm In Ns 50 50  





    ml @ 100 mls/hr IV Q12   





    TESS Rx#:[id number]   


 


Other:   


 


  Intake Quantity Yes Yes 





  Sufficient   


 


  Number of Voids   


 


    Toilet 1  


 


    Urinal 2  








 Microbiology











 11/06/18 20:00 Blood Culture - Final





 Blood    Streptococcus Anginosus Group








 Vital Signs











Temp Pulse Resp BP Pulse Ox


 


 36.4 C   75   20   129/71 H  92 


 


 11/09/18 07:31  11/09/18 07:31  11/09/18 07:31  11/09/18 07:31  11/09/18 07:31








 Laboratory Results





 11/08/18 10:00 





 11/08/18 05:24 











ICD10 Worksheet


Patient Problems: 


 Problems











Problem Status Onset


 


Meningitis Acute

## 2018-11-09 NOTE — CPR
DATE OF PROCEDURE:  11/09/2018



PROCEDURE PERFORMED:  Transesophageal echocardiogram.



INDICATION FOR PROCEDURE:  Bacteremia, inconclusive transthoracic echocardiogram for endocarditis.



PROCEDURE:  After informed consent was obtained for transesophageal echocardiogram as well as anesthe
jaz, patient was sedated with Propofol.  A ANAMARIA probe was passed without incident.  ANAMARIA __________ wer
e used to take detailed images of primarily all valvular structures.  Please see complete echocardiog
carlyn for full details. 



Mitral valve demonstrated mobile echodensities on both anterior and posterior leaflets, with moderate
 to severe mitral regurgitation consistent with endocarditis.  The remainder of the valves did not de
monstrate any evidence of endocarditis. 



At the time of the __________, patient is recovering from anesthesia without difficulty.



PLAN:  We will contact Infectious Disease physician ordering the study, Dr. Diaz, and update her lynette majano findings.





Job #:  [id number]

## 2018-11-09 NOTE — PDANEPAE
ANE History of Present Illness





ANAMARIA





ANE Past Medical History





- Cardiovascular History


Hx Hypertension: Yes


Hx Arrhythmias: No


Hx Chest Pain: No


Hx Coronary Artery / Peripheral Vascular Disease: No


Hx CHF / Valvular Disease: Yes


Hx Palpitations: No


Cardiovascular History Comment: HIGH CHOL.  MITRAL REGURG





- Pulmonary History


Hx COPD: No


Hx Asthma/Reactive Airway Disease: No


Hx Recent Upper Respiratory Infection: No


Hx Oxygen in Use at Home: Yes


O2 in Use at Home (L/minute): 2


Hx Sleep Apnea: Yes


Sleep Apnea Screening Result - Last Documented: Positive


Pulmonary History Comment: PULM HTN.  NAVNEET MILD- O2 AT NIGHT 2 L/M.  COULDNT JAYLEEN 

CPAP.  DENIES SOB W STAIRS.  LUNG DAMAGE, NOT PROCESSING O2 CORRECTLY-

DEMINISHED VOL CAPACITY BETWEEN 25-33 PERCENT.  





- Neurologic History


Hx Cerebrovascular Accident: No


Hx Seizures: No


Hx Dementia: No


Neurologic History Comment: N AND T FEET





- Endocrine History


Hx Diabetes: No


Hypothyroid: No


Hyperthyroid: No


Obesity: mild





- Renal History


Hx Renal Disorders: No





- Liver History


Hx Hepatic Disorders: No





- Neurological & Psychiatric Hx


Hx Neurological and Psychiatric Disorders: No





- Cancer History


Hx Cancer: Yes


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





- Congenital Disorder History


Hx Congenital Disorders: No


Congenital History Comment: VEIN L LEG ABNL, FAM HX





- GI History


Hx Gastrointestinal Disorders: No





- Other Health History


Other Health History: LUPUS.  ? LYME DISEASE.  RETINAL VASCULITIS.  ANTI-CARDIO 

LIPIN ANTIBODY CLOTS X 2 IN 1994 RECENTLY RETESTED RESULTS NEG.  EYES POOR 

FOCUSING.  HAIRS HYPERPIGMENTED





- Chronic Pain History


Chronic Pain: Yes (RT SHLDR)





- Surgical History


Prior Surgeries: LT HAMSTRING REPAIR 6/2014.  LAURITA ING HERNIA REP.  TONSILS AGE 

16.  CUT AS CHILD, CLAMP TO STOP VBLEEDING





ANE Review of Systems


Review of Systems: 








- Exercise capacity


METS (RN): 3 METS





ANE Patient History





- Allergies


Allergies/Adverse Reactions: 








CONTRAST DYE Allergy (Intermediate, Uncoded 11/06/18 16:40)


 Rash








- Home Medications


Home Medications: 








Aspirin EC [Aspirin EC 81 mg (*)] 81 mg PO DAILY 11/06/18 [Last Taken 11/06/18]


Atorvastatin Calcium [Lipitor 40 mg (*)] 40 mg PO HS 11/06/18 [Last Taken 11/05/ 18]


Doxycycline Hyclate 50 mg PO BID 11/06/18 [Last Taken 11/06/18 09:00]


Hydroxychloroquine Sulfate [Plaquenil 200 mg (*)] 200 mg PO BID 11/06/18 [Last 

Taken 11/06/18 09:00]


Mycophenolate Mofetil 1,000 mg PO BID 11/06/18 [Last Taken 11/06/18 09:00]


Ramipril [Altace 5mg (*)] 5 mg PO DAILY 11/06/18 [Last Taken 11/05/18]


amLODIPine BESYLATE [Norvasc 5 mg (*)] 5 mg PO DAILY 11/06/18 [Last Taken 11/05/ 18]








- NPO status


NPO Since - Liquids (Date): 11/09/18


NPO Since - Liquids (Time): 00:00


NPO Since - Solids (Date): 11/09/18


NPO Since - Solids (Time): 00:00





- Smoking Hx


Smoking Status: Never smoked





- Family Anes Hx


Family Hx Anesthesia Complications: NONE





ANE Labs/Vital Signs





- Labs


Result Diagrams: 


 11/08/18 10:00





 11/09/18 09:52





- Vital Signs


Blood Pressure: 129/71


Heart Rate: 75


Respiratory Rate: 20


O2 Sat (%): 92


Height: 175.26 cm


Weight: 78.925 kg





ANE Physical Exam





- Airway


Neck exam: decreased ROM (due to neck pain)


Mouth exam: normal dental/mouth exam (upper caps)





- Pulmonary


Pulmonary: clear to auscultation





- Cardiovascular


Cardiovascular: regular rate and rhythym





- ASA Status


ASA Status: III





ANE Anesthesia Plan


Anesthesia Plan: GA with mask

## 2018-11-09 NOTE — PCMIDPN
Assessment/Plan: 


1. 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 

left iliacus/psoas myositis in immunocompromised host:


Continue medical management of the prevertebral abscess.  Appreciate 

neurosurgical input.  Etiology of bacteremia remains unclear.  Do not feel that 

this organism came from the oropharynx and invaded posteriorly to C1/C2; rather

, given patient's noted neck injury at his gym, suspect this area is a "locus 

minoris resistentiae" from a primary bacteremia.  With abnormal findings on TTE

, a ANAMARIA will be performed today.  Also, given propensity for abscess formation 

with strep anginosus and patient's noted recent 30 lb weight loss, feel that a 

CT scan of the abdomen and pelvis with oral and IV contrast is prudent, even 

with no abdominal symptoms.  This will need to be ordered for tomorrow.  (Do 

not want overload him with procedures today/imbibing oral contrast after ANAMARIA, 

etc).  Continue high-dose ceftriaxone as is.  Repeat blood cultures so far no 

growth.








Over 25 min spent with this patient today.









































11/09/18 10:19





Subjective: 


Events of past 24 hr noted.  TTE revealed some thickening of the mitral valve 

with moderate regurgitation, as well as some abnormalities on the aortic valve.

  Patient have ANAMARIA today.  MRIs of the pelvis and hip show inflammation of the 

left iliacus and psoas muscles, as well as a left gluteus minimus tendinopathy.





Patient has"cotton mouth".  Feels thirsty.  No diarrhea on the antibiotics.  

Understands plan for today with ANAMARIA.  Talked to him about a CT scan of the 

abdomen and pelvis moving forward and he is agreeable to that tomorrow.  Neck 

pain is stable.  No new neurologic symptoms.





Objective: 


 Vital Signs











Temp Pulse Resp BP Pulse Ox


 


 36.4 C   75   20   129/71 H  92 


 


 11/09/18 07:31  11/09/18 07:31  11/09/18 07:31  11/09/18 07:31  11/09/18 07:31








 Microbiology











 11/06/18 20:00 Blood Culture - Final





 Blood    Streptococcus Anginosus Group








 Laboratory Results





 11/08/18 10:00 





 11/08/18 05:24 





 











 11/08/18 11/09/18 11/10/18





 05:59 05:59 05:59


 


Intake Total 650  


 


Balance 650  








 











ESR  37 MM/HR (0-20)  H  11/08/18  10:00    


 


C-Reactive Protein  217.4 mg/L (<10.0)  H  11/08/18  10:00    














- Physical Exam


General Appearance: alert, no apparent distress, other (Very dry mouth)


EENT: No thrush


Respiratory: lungs clear


Cardiac/Chest: systolic murmur ([place] best at apex)


Abdomen: non-tender, soft


Skin: other, No embolic lesions (Hyper pigmentation lower extremities 

bilaterally is old, secondary to Plaquenil)


Neuro/Psych: no motor/sensory deficits, oriented x 3





ICD10 Worksheet


Patient Problems: 


 Problems











Problem Status Onset


 


Meningitis Acute

## 2018-11-09 NOTE — HOSPPROG
Hospitalist Progress Note


Assessment/Plan: 





DIAGNOSES: 


* acute streptococcal bacteremia


* prevertebral abscess upper cervical related to above


* ileus psoas myositis likely also due to strep infection


* meningitis, question actual CSF infection verses irritation from abscess


* concern for bacterial endocarditis with significant worsening of mitral 

regurgitation and pulmonary hypertension, TTE unable to rule out vegetation


* immune suppression from medications for lupus


* chronic lupus


* Mycophenolate and Plaquenil on hold at the moment


* severe pulmonary hypertension


* valvular heart disease with mitral regurgitation


* coronary artery disease, no stents in place





PLANS:


* Continue current antibiotics; duration will depend on findings of further 

assessments and clearance of cultures


* Plan on ANAMARIA to be done today


* Review with Cardiology, Infectious Disease after ANAMARIA, and will determine 

duration of antibiotic therapy





SUBJECTIVE:


Some continued improvement in pain and is more mobile today, getting up with 

assistance and ambulating across room with his walker 


No chills or sweats


No new areas of pain





OBJECTIVE


Vitals reviewed:  Stable without fever so far today





Exam:


alert oriented 


No focal neurologic changes, no confusion


Moving more easily but still needing assistance to get from supine to the side 

of bed


skin warm dry color ok


resps not labored


lungs clear BSs


heart regular


abd soft nondistended nontender, bowel sounds present


limbs warm, no edema





iv site ok





Lab data:


Unremarkable metabolic panel, persisting mild elevation of transaminases





MRI pelvis and lumbar spine shows evidence of myositis at the iliopsoas on left

, some small bilateral hip joint effusions and some bursitis at trochanteric 

location but no osteo or abscesses





Objective: 


 Vital Signs











Temp Pulse Resp BP Pulse Ox


 


 36.4 C   75   20   129/71 H  92 


 


 11/09/18 07:31  11/09/18 12:42  11/09/18 12:42  11/09/18 12:42  11/09/18 12:42








 Microbiology











 11/06/18 20:00 Blood Culture - Final





 Blood    Streptococcus Anginosus Group








 Laboratory Results





 11/08/18 10:00 





 11/09/18 09:52 





 











 11/08/18 11/09/18 11/10/18





 06:59 06:59 06:59


 


Intake Total 650  


 


Balance 650  














- Time Spent With Patient


Time Spent with Patient: greater than 35 minutes


Time Spent with Patient: Greater than 35 minutes spent on this patients care, 

greater than 50% of time spent counseling, educating, and coordinating care 

regarding the above mentioned plan.





ICD10 Worksheet


Patient Problems: 


 Problems











Problem Status Onset


 


Meningitis Acute

## 2018-11-09 NOTE — PDCTREPORT
Cardiothoracic Procedure Rpt


Cardiothoracic Procedure Report: 





73 year old with strep endocarditis


Images reviewed with Dr. Man


Pt has not had emoboli, or CHF


I would agree medical therapy is best first option


Surgery could be considered if he fails medical therapy


Patient Problems: 


 Problems











Problem Status Onset


 


Meningitis Acute

## 2018-11-10 PROCEDURE — 02HV33Z INSERTION OF INFUSION DEVICE INTO SUPERIOR VENA CAVA, PERCUTANEOUS APPROACH: ICD-10-PCS | Performed by: RADIOLOGY

## 2018-11-10 RX ADMIN — ACETAMINOPHEN PRN MG: 325 TABLET ORAL at 09:15

## 2018-11-10 RX ADMIN — DOXYCYCLINE HYCLATE SCH MG: 100 CAPSULE, GELATIN COATED ORAL at 21:13

## 2018-11-10 RX ADMIN — HYDROXYCHLOROQUINE SULFATE SCH: 200 TABLET, FILM COATED ORAL at 14:54

## 2018-11-10 RX ADMIN — HYDROXYCHLOROQUINE SULFATE SCH MG: 200 TABLET, FILM COATED ORAL at 21:13

## 2018-11-10 RX ADMIN — ATORVASTATIN CALCIUM SCH MG: 40 TABLET, FILM COATED ORAL at 21:13

## 2018-11-10 RX ADMIN — DOXYCYCLINE HYCLATE SCH MG: 100 CAPSULE, GELATIN COATED ORAL at 08:56

## 2018-11-10 RX ADMIN — ASPIRIN SCH MG: 81 TABLET, DELAYED RELEASE ORAL at 08:56

## 2018-11-10 RX ADMIN — RAMIPRIL SCH MG: 5 CAPSULE ORAL at 08:57

## 2018-11-10 NOTE — PCMIDPN
Assessment/Plan: 





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

2 prevertebral abscess 3x 0.5cm, also some associated epidural inflammatory 

change, left iliacus/psoas myositis - all could be consider embolic from MV 

disease - but hard to know which component of disease came first.  Chronic 

immune compromise likely contributing to extent of disease.  Mildly abnormal 

CSF due to epidural disease.  Source of infection is not entirely clear at this 

point.  O2 requirements close to baseline.


--CT today to r/o intra-abdominal source, pre Rx w steroids/Benadryl w h/o 

allergy


--PICC line today


--ceftriaxone 2gm IV q12 due to epidural involvement


--plan 6-8 weeks IV antibiotics (longer duration due to spine involvement) 1/3/

19 is stop date for 8 weeks


--both ENT, neurosurg evaluated and want to manage conservatively for now





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 

hold at the moment





meds


ceftriaxone 2gm IV q12h





micro


11/6 Blood cx (2) streptococcus anginosus HUMBLE ceftriaxone <=0.125, PCN 0.0625


11/8 blood x (2) NGTD


11/6 CSF culture:  Negative








Subjective: 





patient still with some R neck pain and L hip, side pain - feeling a bit better


asking about discharge and when he can return to work


Objective: 


 Vital Signs











Temp Pulse Resp BP Pulse Ox


 


 36.3 C   74   16   143/75 H  93 


 


 11/10/18 08:00  11/10/18 08:00  11/10/18 08:00  11/10/18 08:00  11/10/18 08:00








 Microbiology











 11/06/18 20:00 Blood Culture - Final





 Blood    Streptococcus Anginosus Group








 Laboratory Results





 11/08/18 10:00 





 11/09/18 09:52 





 











 11/09/18 11/10/18 11/11/18





 05:59 05:59 05:59


 


Intake Total  400 


 


Balance  400 








 











ESR  37 MM/HR (0-20)  H  11/08/18  10:00    


 


C-Reactive Protein  217.4 mg/L (<10.0)  H  11/08/18  10:00    














- Physical Exam


General Appearance: alert, no apparent distress, non-toxic


EENT: pale conjunctiva, No conjunctival petechiae


Respiratory: lungs clear, No accessory muscle use


Cardiac/Chest: regular rate, rhythm, diastolic murmur, systolic murmur


Extremities: No pedal edema


Abdomen: non-tender, soft


Skin: No rash, No embolic lesions


Neuro/Psych: alert, normal mood/affect, oriented x 3





- Time Spent With Patient


Time Spent with Patient: greater than 35 minutes


Time Spent with Patient: Greater than 35 minutes spent on this patients care, 

greater than 50% of time spent counseling, educating, and coordinating care 

regarding the above mentioned plan.





ICD10 Worksheet


Patient Problems: 


 Problems











Problem Status Onset


 


Meningitis Acute

## 2018-11-10 NOTE — CPEKG
Test Reason : OPEN

Blood Pressure : ***/*** mmHG

Vent. Rate : 067 BPM     Atrial Rate : 067 BPM

   P-R Int : 170 ms          QRS Dur : 098 ms

    QT Int : 498 ms       P-R-T Axes : 013 -25 117 degrees

   QTc Int : 526 ms

 

Sinus rhythm

Ventricular premature complex

Borderline left axis deviation

Borderline abnrm T, anterolateral leads

 

Confirmed by Josh Hudson ([id number]) on 11/10/2018 7:58:25 AM

 

Referred By:             Confirmed By:Josh Hudson

## 2018-11-10 NOTE — HOSPPROG
Hospitalist Progress Note


Assessment/Plan: 





DIAGNOSES: 


* acute streptococcal bacteremia


* prevertebral abscess upper cervical related to above


* ileus psoas myositis likely also due to strep infection


* meningitis, question actual CSF infection verses irritation from abscess


* bacterial endocarditis with mitral vegetation and chronic mitral 

regurgitation and pulmonary hypertension


* immune suppression from medications for lupus


* Mycophenolate and Plaquenil on hold at the moment


* Have reviewed with Dr. Benson today, it is felt that the Plaquenil would be 

safe to resume at this time


* severe pulmonary hypertension


* coronary artery disease, no stents in place, stable





PLANS:


* Continue current antibiotics, PICC catheter now placed


* Resume his Plaquenil at this time, continue to hold mycophenolate


* Review with ID in Cardiology whether another echocardiogram indicated before 

leaving





SUBJECTIVE:


 continued improvement in pain today, getting up with assistance and ambulating 

across room with his walker 


No chills or sweats


No new areas of pain





OBJECTIVE


Vitals reviewed:  Stable without fever so far today





Exam:


alert oriented 


No focal neurologic changes, no confusion


Moving more easily but still needing assistance to get from supine to the side 

of bed


skin warm dry color ok


resps not labored


lungs clear BSs


heart regular


abd soft nondistended nontender, bowel sounds present


limbs warm, no edema





iv site ok





MRI pelvis and lumbar spine shows evidence of myositis at the iliopsoas on left

, some small bilateral hip joint effusions and some bursitis at trochanteric 

location but no osteo or abscesses





Objective: 


 Vital Signs











Temp Pulse Resp BP Pulse Ox


 


 36.4 C   74   16   124/67 H  90 L


 


 11/10/18 15:36  11/10/18 15:36  11/10/18 15:36  11/10/18 15:36  11/10/18 15:36








 Laboratory Results





 11/08/18 10:00 





 11/09/18 09:52 





 











 11/09/18 11/10/18 11/11/18





 06:59 06:59 06:59


 


Intake Total  400 


 


Balance  400 














ICD10 Worksheet


Patient Problems: 


 Problems











Problem Status Onset


 


Meningitis Acute

## 2018-11-10 NOTE — PDIAF
- Diagnosis


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


Code Status: Full Code





- Medication Management


Long Term Antibiotics: ceftriaxone 2gm IV q12h


Long Term Antibiotic Stop Date: 01/03/19


Discharge Medications: electronically signed and located in the Home Medication 

List.


PICC Care - Routine: Yes





- Orders


Services needed: Home Care, Registered Nurse


Home Care Face to Face: I certify that this patient was under my care and that 

I had the required face-to-face encounter meeting the encounter requirements on 

the discharge day.  My findings support the fact that the patient is homebound 

as defined in


Home Care Face to Face Continued: CMS Chapter 7 Medicare Benefits Manual 30.1.1

, The condition of the patient is such that there exists a normal inability to 

leave home and consequently, leaving home would require a considerable and 

taxing effort.


Isolation Type: None





- Labs/Radiology


CBC w/diff Date: 11/19/18 (Weekly Monday)


CMP Date: 11/19/18 (Weekly Monday)


CRP Date: 11/19/18 (Weekly Monday)


Call or Fax Lab and Imaging Results to: Carmelo Nieto MD [facility] 

[facility] at fax [phone]





- Follow Up Care


Current Providers and Referrals: 


MAI SANTIAGO [Primary Care Provider] - As per Instructions


Carmelo Nieto MD [Medical Doctor] - follow up in 10 days

## 2018-11-11 LAB — PLATELET # BLD: 139 10^3/UL (ref 150–400)

## 2018-11-11 RX ADMIN — RAMIPRIL SCH MG: 5 CAPSULE ORAL at 09:48

## 2018-11-11 RX ADMIN — ACETAMINOPHEN PRN MG: 325 TABLET ORAL at 20:25

## 2018-11-11 RX ADMIN — HYDROXYCHLOROQUINE SULFATE SCH MG: 200 TABLET, FILM COATED ORAL at 20:25

## 2018-11-11 RX ADMIN — ASPIRIN SCH MG: 81 TABLET, DELAYED RELEASE ORAL at 09:49

## 2018-11-11 RX ADMIN — ACETAMINOPHEN PRN MG: 325 TABLET ORAL at 10:01

## 2018-11-11 RX ADMIN — DOXYCYCLINE HYCLATE SCH MG: 100 CAPSULE, GELATIN COATED ORAL at 09:49

## 2018-11-11 RX ADMIN — DOXYCYCLINE HYCLATE SCH MG: 100 CAPSULE, GELATIN COATED ORAL at 20:25

## 2018-11-11 RX ADMIN — ATORVASTATIN CALCIUM SCH MG: 40 TABLET, FILM COATED ORAL at 20:25

## 2018-11-11 RX ADMIN — HYDROXYCHLOROQUINE SULFATE SCH MG: 200 TABLET, FILM COATED ORAL at 09:48

## 2018-11-11 NOTE — HOSPPROG
Hospitalist Progress Note


Assessment/Plan: 





72 yo M w lupus on chronic immunosuppression here w Strep anginosus 

endocarditis w emboli








bacteremia: cleared





endocarditis: confirmed by ANAMARIA   


   CT surgery to see 11/12





lupus: restart plaquenil 


   MMF on hold





?csf infection: more likely irritation





splenic infarct: embolic phenomen





proph: lmwh





dispo: inpt














Subjective: case d/w dr vergara


Objective: 


 Vital Signs











Temp Pulse Resp BP Pulse Ox


 


 36.3 C   78   20   121/69 H  93 


 


 11/11/18 12:00  11/11/18 12:00  11/11/18 12:00  11/11/18 12:00  11/11/18 12:00








 Laboratory Results





 11/11/18 04:20 





 11/11/18 04:20 





 











 11/10/18 11/11/18 11/12/18





 05:59 05:59 05:59


 


Intake Total 400  


 


Output Total  1 


 


Balance 400 -1 














- Physical Exam


Constitutional: no apparent distress, appears nourished


Eyes: PERRL, anicteric sclera


Ears, Nose, Mouth, Throat: moist mucous membranes, hearing normal


Cardiovascular: regular rate and rhythym, no murmur, rub, or gallop


Respiratory: no respiratory distress, no rales or rhonchi


Gastrointestinal: normoactive bowel sounds, soft, non-tender abdomen


Genitourinary: no bladder fullness, No tobar in urethra


Skin: warm, normal color


Musculoskeletal: No no muscle tenderness


Neurologic: AAOx3


Psychiatric: interacting appropriately


Lymph, Heme, Immunologic: no cervical LAD





ICD10 Worksheet


Patient Problems: 


 Problems











Problem Status Onset


 


Meningitis Acute

## 2018-11-11 NOTE — PCMIDPN
Assessment/Plan: 





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

2 prevertebral abscess 3x 0.5cm, also some associated epidural inflammatory 

change, left iliacus/psoas myositis - all could be consider embolic from MV 

disease.  Yesterday, emboli to spleen also ID'd.  L iliacus changes on CT 

yesterday were stable


--planning 8 weeks of IV antibiotics in light of spine involvement, Stop Date: 

01/03/19


--requested formal CT surgery consult due to multiple emboli.  Communicated to 

Dr. Man on 11/11/18 who will pass on to CT surg





# increasing LFT: cholelithiasis on CT but no cholecystitis, no abdominal pain.

  Could be ceftriaxone effect


--repeat LFTs tomorrow


--could consider change to high dose PCN tomorrow if LFTs continue to increase, 

can still cause LFT elevation but to a lesser extent





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





meds


ceftriaxone 2gm IV q12h





micro


11/6 Blood cx (2) streptococcus anginosus HUMBLE ceftriaxone <=0.125, PCN 0.0625


11/8 blood x (2) NGTD


11/6 CSF culture:  Negative





Care coordinated w radiology and Dr. Hunt





Subjective: 





patient only c/o is L lateral hip pain


neck pain has resolved


no CP, no SOB


Objective: 


 Vital Signs











Temp Pulse Resp BP Pulse Ox


 


 36.6 C   104 H  18   114/75   98 


 


 11/11/18 08:00  11/11/18 08:00  11/11/18 08:00  11/11/18 08:00  11/11/18 08:00








 Laboratory Results





 11/11/18 04:20 





 11/11/18 04:20 





 











 11/10/18 11/11/18 11/12/18





 05:59 05:59 05:59


 


Intake Total 400  


 


Output Total  1 


 


Balance 400 -1 








 











ESR  37 MM/HR (0-20)  H  11/08/18  10:00    


 


C-Reactive Protein  147.4 mg/L (<10.0)  H  11/11/18  04:20    














- Physical Exam


General Appearance: alert, no apparent distress


EENT: No scleral icterus, No conjunctival petechiae


Respiratory: lungs clear, No accessory muscle use


Cardiac/Chest: regular rate, rhythm, diastolic murmur, systolic murmur


Abdomen: non-tender, soft, other (L lateral hip pain to palpation)


Skin: pallor, No jaundice, No rash


Neuro/Psych: alert, normal mood/affect, oriented x 3





- Line/s


  ** LUE PICC


Lines: No drainage, No erythema





- Time Spent With Patient


Time Spent with Patient: greater than 35 minutes


Time Spent with Patient: Greater than 35 minutes spent on this patients care, 

greater than 50% of time spent counseling, educating, and coordinating care 

regarding the above mentioned plan.





ICD10 Worksheet


Patient Problems: 


 Problems











Problem Status Onset


 


Meningitis Acute

## 2018-11-12 RX ADMIN — HYDROXYCHLOROQUINE SULFATE SCH MG: 200 TABLET, FILM COATED ORAL at 09:05

## 2018-11-12 RX ADMIN — ASPIRIN SCH MG: 81 TABLET, DELAYED RELEASE ORAL at 09:06

## 2018-11-12 RX ADMIN — RAMIPRIL SCH MG: 5 CAPSULE ORAL at 09:06

## 2018-11-12 RX ADMIN — HYDROXYCHLOROQUINE SULFATE SCH MG: 200 TABLET, FILM COATED ORAL at 21:46

## 2018-11-12 RX ADMIN — ATORVASTATIN CALCIUM SCH MG: 40 TABLET, FILM COATED ORAL at 21:46

## 2018-11-12 RX ADMIN — DOXYCYCLINE HYCLATE SCH MG: 100 CAPSULE, GELATIN COATED ORAL at 21:46

## 2018-11-12 RX ADMIN — DOXYCYCLINE HYCLATE SCH MG: 100 CAPSULE, GELATIN COATED ORAL at 09:06

## 2018-11-12 NOTE — PDSURGCRDT
CardioThoracic Surgery Note





- Objective


Objective: 


Asked to this this active, working 73 year old gentlemen admitted with strep 

endocarditis. 


He has been treated in the past with Cellcept for lupus


Generally he feels better since admission.


Echo shows mitral valve vegetations with mild to moderate mitral regurg.  LV nl.


Recent evidence of embolization to spleen.


I recommend mitral valve replacement.


I had a long discussion with the patient and his wife.


Risks benefits and alternatives reviewed and he agrees to proceed.


He will need cath prior to surgery.


Plan is for surgery on Wednesday.


 Vital Signs











Temp Pulse Resp BP Pulse Ox


 


 36.4 C   80   16   117/63   92 


 


 11/12/18 11:13  11/12/18 11:13  11/12/18 07:50  11/12/18 11:13  11/12/18 11:13








 Laboratory Results





 11/11/18 04:20 





 11/11/18 04:20 





 











 11/11/18 11/12/18 11/13/18





 05:59 05:59 05:59


 


Output Total 1  


 


Balance -1  








 











ESR  37 MM/HR (0-20)  H  11/08/18  10:00    


 


C-Reactive Protein  147.4 mg/L (<10.0)  H  11/11/18  04:20    














Exam














Temp Pulse Resp BP Pulse Ox


 


 36.4 C   80   16   117/63   92 


 


 11/12/18 11:13  11/12/18 11:13  11/12/18 07:50  11/12/18 11:13  11/12/18 11:13




















O2 (L/minute)                  2

## 2018-11-12 NOTE — PCMIDPN
Assessment/Plan: 





Assessment/Plan:


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 

prevertebral abscess, left iliacus/psoas myositis, and splenic emboli:  

Clinically improved with ceftriaxone therapy.  Repeat blood cultures show 

clearing of bacteremia.  Appreciate CT surgery consultation with plans for 

mitral valve replacement given multiple embolic sites later this week.  

Continue ceftriaxone 2 g IV q.12 hours (given epidural inflammatory change 

using Q 12).  Patient has asked that I communicate above findings with his 

treating cardiologist and rheumatologist.


* Elevated LFTs:  Mild increase in AST and ALT today versus yesterday without 

clinical signs of hepatitis.  Will continue to follow carefully and if show 

continued increased will consider change to penicillin as Streptococcus 

anginosus isolate is susceptible.


* Immunosuppression due to use of CellCept.





Time spent, greater than 35 min, which greater than half was spent in education/

counseling/coordination of care related to mitral valve endocarditis and plan 

of care.





11/12/18 16:05





Subjective: 





Overall patient feels better with significant decrease in neck pain.  Still 

complains of left hip pain and thigh pain.  Seen by cardiothoracic surgery 

earlier today with plans for mitral valve replacement later this week.


Objective: 


 Vital Signs











Temp Pulse Resp BP Pulse Ox


 


 36.6 C   80   16   123/72 H  90 L


 


 11/12/18 15:16  11/12/18 15:16  11/12/18 15:16  11/12/18 15:16  11/12/18 15:16








 Laboratory Results





 11/11/18 04:20 





 11/11/18 04:20 





 











 11/11/18 11/12/18 11/13/18





 05:59 05:59 05:59


 


Output Total 1  


 


Balance -1  








 











ESR  37 MM/HR (0-20)  H  11/08/18  10:00    


 


C-Reactive Protein  147.4 mg/L (<10.0)  H  11/11/18  04:20    








Ceftriaxone 2 g IV q.12 hours # 6


Blood cultures 11/8/2018 no growth


Blood cultures 11/6/2018 2/2 Streptococcus anginosus





- Physical Exam


General Appearance: alert, no apparent distress


EENT: No scleral icterus, No conjunctival petechiae


Respiratory: lungs clear, No respiratory distress


Neck: non-tender, No meningismus


Cardiac/Chest: regular rate, rhythm, systolic murmur (2/6 throughout)


Abdomen: non-tender, No distended


Skin: No embolic lesions





ICD10 Worksheet


Patient Problems: 


 Problems











Problem Status Onset


 


Endocarditis Acute  


 


Meningitis Acute

## 2018-11-12 NOTE — ASMTCMCOM
CM Note

 

CM Note                       

Notes:

CM spoke to Dr. Hunt regarding d/c POC. Pt will require ivabx at time of d/c. Referral made to 

[facility] and [facility]. Both are able to accept. Estelle from [facility] met w/ pt today. CM to follow.







Plan: [facility] w/ [facility]; RN

 

Date Signed:  11/12/2018 11:22 AM

Electronically Signed By:PATRICE Sheldon

## 2018-11-12 NOTE — HOSPPROG
Hospitalist Progress Note


Assessment/Plan: 





74 yo M w lupus on chronic immunosuppression here w Strep anginosus 

endocarditis w emboli








bacteremia: cleared





endocarditis: confirmed by ANAMARIA   


   CT surgery to see 11/12





lupus: restart plaquenil 


   MMF on hold





?csf infection: more likely irritation from adjacent infection given neg csf 

micro





elevated lft's: rising slowly but steadily


   may be due to ceftiaxone


   will d/w ID





pre valve replacement: needs cath prior


   i spoke w Dr. Walsh





splenic infarct: embolic phenomen





proph: lmwh





dispo: inpt














Subjective: case d/w dr walsh.  CT surgery rec MVR.  afebrile


Objective: 


 Vital Signs











Temp Pulse Resp BP Pulse Ox


 


 36.4 C   80   16   117/63   92 


 


 11/12/18 11:13  11/12/18 11:13  11/12/18 07:50  11/12/18 11:13  11/12/18 11:13








 Laboratory Results





 11/11/18 04:20 





 11/11/18 04:20 





 











 11/11/18 11/12/18 11/13/18





 05:59 05:59 05:59


 


Output Total 1  


 


Balance -1  














- Physical Exam


Constitutional: no apparent distress, appears nourished


Eyes: PERRL, anicteric sclera


Ears, Nose, Mouth, Throat: moist mucous membranes, hearing normal


Cardiovascular: regular rate and rhythym, systolic murmur, No edema


Respiratory: no respiratory distress, no rales or rhonchi


Gastrointestinal: normoactive bowel sounds, soft, non-tender abdomen


Genitourinary: no bladder fullness, No tobar in urethra


Skin: warm


Musculoskeletal: full muscle strength


Neurologic: AAOx3





ICD10 Worksheet


Patient Problems: 


 Problems











Problem Status Onset


 


Endocarditis Acute  


 


Meningitis Acute

## 2018-11-13 LAB
INR PPP: 1.11 (ref 0.83–1.16)
PLATELET # BLD: 161 10^3/UL (ref 150–400)
PROTHROMBIN TIME: 14.5 SEC (ref 12–15)

## 2018-11-13 PROCEDURE — 4A023N7 MEASUREMENT OF CARDIAC SAMPLING AND PRESSURE, LEFT HEART, PERCUTANEOUS APPROACH: ICD-10-PCS | Performed by: INTERNAL MEDICINE

## 2018-11-13 PROCEDURE — B2111ZZ FLUOROSCOPY OF MULTIPLE CORONARY ARTERIES USING LOW OSMOLAR CONTRAST: ICD-10-PCS | Performed by: INTERNAL MEDICINE

## 2018-11-13 RX ADMIN — HYDROXYCHLOROQUINE SULFATE SCH MG: 200 TABLET, FILM COATED ORAL at 09:12

## 2018-11-13 RX ADMIN — ACETAMINOPHEN PRN MG: 325 TABLET ORAL at 09:26

## 2018-11-13 RX ADMIN — DOXYCYCLINE HYCLATE SCH MG: 100 CAPSULE, GELATIN COATED ORAL at 23:08

## 2018-11-13 RX ADMIN — HYDROXYCHLOROQUINE SULFATE SCH MG: 200 TABLET, FILM COATED ORAL at 23:08

## 2018-11-13 RX ADMIN — ASPIRIN SCH: 81 TABLET, DELAYED RELEASE ORAL at 10:21

## 2018-11-13 RX ADMIN — RAMIPRIL SCH MG: 5 CAPSULE ORAL at 09:11

## 2018-11-13 RX ADMIN — DOXYCYCLINE HYCLATE SCH MG: 100 CAPSULE, GELATIN COATED ORAL at 09:11

## 2018-11-13 RX ADMIN — ATORVASTATIN CALCIUM SCH MG: 40 TABLET, FILM COATED ORAL at 23:09

## 2018-11-13 NOTE — PDDXCAT
Diagnostic Cath Note





- .


Date: 11/13/18


: oJseph


Indication: other (Preop mitral valve replacement)





- Procedure


Access: right wrist


Procedure: left heart catheterization, coronary angiography





- Materials


Left Heart Cath size: 5F


Left Heart Cath materials: other (TIGER)





- Findings-Left Heart Catheterization


LM: Unobstructed


LAD: Minimal luminal irregularities


LCX: Minimal luminal irregularities


RCA: Dominant:  70% distal stenosis eccentric.


Complications: None


Estimated blood loss: <50ml


Closure method: TR Band


Assessment: Moderate atherosclerotic cardiovascular disease with most critical 

stenosis in the distal RCA.


Plan: 





Mitral valve replacement with consideration for coronary artery bypass graft to 

the distal RCA versus hybrid stent.


Patient Problems: 


 Problems











Problem Status Onset


 


Meningitis Acute  


 


Endocarditis Acute

## 2018-11-13 NOTE — HOSPPROG
Hospitalist Progress Note


Assessment/Plan: 





74 yo M w lupus on chronic immunosuppression here w Strep anginosus 

endocarditis w emboli








bacteremia: cleared





endocarditis: confirmed by ANAMARIA   


   CT surgery to see 11/12





lupus: restart plaquenil 


   MMF on hold





?csf infection: more likely irritation from adjacent infection given neg csf 

micro





elevated lft's: rising slowly but steadily


   may be due to ceftiaxone


   will d/w ID





   11/13: lower today


   continue ceftriaxone


   follow





pre valve replacement: cath today





splenic infarct: embolic phenomen





proph: lmwh





dispo: inpt














Subjective: case d/w dr babb.  lft's slightly better


Objective: 


 Vital Signs











Temp Pulse Resp BP Pulse Ox


 


 36.5 C   78   16   129/78 H  92 


 


 11/13/18 07:24  11/13/18 07:24  11/13/18 07:24  11/13/18 07:24  11/13/18 07:24








 Laboratory Results





 11/11/18 04:20 





 11/11/18 04:20 





 











 11/12/18 11/13/18 11/14/18





 05:59 05:59 05:59


 


Intake Total  0 


 


Balance  0 














- Physical Exam


Constitutional: no apparent distress, appears nourished


Eyes: PERRL


Ears, Nose, Mouth, Throat: moist mucous membranes, hearing normal


Cardiovascular: regular rate and rhythym, no murmur, rub, or gallop


Respiratory: no respiratory distress, no rales or rhonchi


Gastrointestinal: normoactive bowel sounds, soft, non-tender abdomen


Genitourinary: no bladder fullness, No tobar in urethra


Skin: warm, other (black, non balnching rash in LE)


Musculoskeletal: full muscle strength


Neurologic: AAOx3





ICD10 Worksheet


Patient Problems: 


 Problems











Problem Status Onset


 


Endocarditis Acute  


 


Meningitis Acute

## 2018-11-13 NOTE — PCMIDPN
Assessment/Plan: 





Assessment/Plan:


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 

prevertebral abscess, left iliacus/psoas myositis, and splenic emboli:  

Continued clinical improvement with ceftriaxone.  Repeat blood cultures are no 

growth.  Plans for mitral valve replacement given multiple embolic sites.


* Elevated LFTs:  Decreased today with likely some element of drug effect.  

Continue to monitor over time.


* Immunosuppression due to use of CellCept.


* Rash:  New petechial rash over thighs, flank and back.  Most likely drug 

related with considerations being IV contrast versus possibility of 

ceftriaxone.  Currently does not have limiting appearance if related to 

ceftriaxone and can be monitored further over time.





11/13/18 16:42





Subjective: 





Patient status post cardiac catheterization.  Catheterization findings noted 

including RCA disease.  No further neck pain and left hip pain is less 

prominent.


Objective: 


 Vital Signs











Temp Pulse Resp BP Pulse Ox


 


 36.5 C   65   14   119/52 L  94 


 


 11/13/18 07:24  11/13/18 15:57  11/13/18 15:57  11/13/18 15:57  11/13/18 15:57








 Laboratory Results





 11/11/18 04:20 





 11/11/18 04:20 





 











 11/12/18 11/13/18 11/14/18





 05:59 05:59 05:59


 


Intake Total  0 


 


Balance  0 








 











ESR  37 MM/HR (0-20)  H  11/08/18  10:00    


 


C-Reactive Protein  147.4 mg/L (<10.0)  H  11/11/18  04:20    








Ceftriaxone # 7


Blood cultures 11/8/18 no growth


Blood cultures 11/6/18 2/2 S. anginosus





- Physical Exam


General Appearance: alert, no apparent distress


EENT: No scleral icterus, No conjunctival petechiae


Respiratory: lungs clear, No respiratory distress


Cardiac/Chest: regular rate, rhythm, systolic murmur


Abdomen: non-tender, No distended


Skin: rash (Petechial rash over bilateral anterior thighs, left gluteal region, 

and confluent erythema over entirety of back without blisters)


Neuro/Psych: alert





ICD10 Worksheet


Patient Problems: 


 Problems











Problem Status Onset


 


Endocarditis Acute  


 


Meningitis Acute

## 2018-11-14 LAB
INR PPP: 1.63 (ref 0.83–1.16)
PLATELET # BLD: 177 10^3/UL (ref 150–400)
PROTHROMBIN TIME: 19.5 SEC (ref 12–15)

## 2018-11-14 PROCEDURE — 5A1221Z PERFORMANCE OF CARDIAC OUTPUT, CONTINUOUS: ICD-10-PCS | Performed by: THORACIC SURGERY (CARDIOTHORACIC VASCULAR SURGERY)

## 2018-11-14 PROCEDURE — 06BQ4ZZ EXCISION OF LEFT SAPHENOUS VEIN, PERCUTANEOUS ENDOSCOPIC APPROACH: ICD-10-PCS | Performed by: THORACIC SURGERY (CARDIOTHORACIC VASCULAR SURGERY)

## 2018-11-14 PROCEDURE — 30233R1 TRANSFUSION OF NONAUTOLOGOUS PLATELETS INTO PERIPHERAL VEIN, PERCUTANEOUS APPROACH: ICD-10-PCS | Performed by: THORACIC SURGERY (CARDIOTHORACIC VASCULAR SURGERY)

## 2018-11-14 PROCEDURE — 02RG08Z REPLACEMENT OF MITRAL VALVE WITH ZOOPLASTIC TISSUE, OPEN APPROACH: ICD-10-PCS | Performed by: THORACIC SURGERY (CARDIOTHORACIC VASCULAR SURGERY)

## 2018-11-14 PROCEDURE — 021009W BYPASS CORONARY ARTERY, ONE ARTERY FROM AORTA WITH AUTOLOGOUS VENOUS TISSUE, OPEN APPROACH: ICD-10-PCS | Performed by: THORACIC SURGERY (CARDIOTHORACIC VASCULAR SURGERY)

## 2018-11-14 PROCEDURE — 30233K1 TRANSFUSION OF NONAUTOLOGOUS FROZEN PLASMA INTO PERIPHERAL VEIN, PERCUTANEOUS APPROACH: ICD-10-PCS | Performed by: THORACIC SURGERY (CARDIOTHORACIC VASCULAR SURGERY)

## 2018-11-14 RX ADMIN — RAMIPRIL SCH: 5 CAPSULE ORAL at 09:49

## 2018-11-14 RX ADMIN — ONDANSETRON PRN MG: 2 SOLUTION INTRAMUSCULAR; INTRAVENOUS at 20:26

## 2018-11-14 RX ADMIN — MUPIROCIN SCH DOSE: 20 OINTMENT TOPICAL at 21:51

## 2018-11-14 RX ADMIN — ASPIRIN SCH: 81 TABLET, DELAYED RELEASE ORAL at 09:48

## 2018-11-14 RX ADMIN — HYDROXYCHLOROQUINE SULFATE SCH: 200 TABLET, FILM COATED ORAL at 09:24

## 2018-11-14 RX ADMIN — DOXYCYCLINE HYCLATE SCH: 100 CAPSULE, GELATIN COATED ORAL at 09:48

## 2018-11-14 RX ADMIN — POTASSIUM CHLORIDE PRN MLS: 400 INJECTION, SOLUTION INTRAVENOUS at 18:12

## 2018-11-14 RX ADMIN — POTASSIUM CHLORIDE PRN MLS: 400 INJECTION, SOLUTION INTRAVENOUS at 18:46

## 2018-11-14 RX ADMIN — DOXYCYCLINE HYCLATE SCH MG: 100 CAPSULE, GELATIN COATED ORAL at 21:51

## 2018-11-14 NOTE — PCMIDPN
Assessment/Plan: 





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

2 prevertebral abscess 3x 0.5cm, also some associated epidural inflammatory 

change, left iliacus/psoas myositis, emboli to spleen .  Blood cx 11/8 

demonstrate clearance


--planning 8 weeks of IV antibiotics in light of spine involvement, Tenative 

Stop Date: 01/03/19 (may need to be adjusted depending on valve cx)


--MVR today





# elevated LFTs: stable to slightly improved





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





meds


ceftriaxone 2gm IV q12h #8





micro


11/6 Blood cx (2) streptococcus anginosus HUMBLE ceftriaxone <=0.125, PCN 0.0625


11/8 blood x (2) Neg


11/6 CSF culture:  Negative





Subjective: 





patient w/o specific c/o


surgery today


wife at bedside


Objective: 


 Vital Signs











Temp Pulse Resp BP Pulse Ox


 


 36.3 C   71   12   159/70 H  91 L


 


 11/14/18 07:19  11/14/18 07:19  11/14/18 07:19  11/14/18 09:50  11/14/18 07:19








 Microbiology











 11/08/18 10:00 Blood Culture - Final





 Blood 


 


 11/08/18 10:00 Blood Culture - Final





 Blood 








 Laboratory Results





 11/14/18 06:00 





 11/14/18 06:00 





 











 11/13/18 11/14/18 11/15/18





 05:59 05:59 05:59


 


Intake Total 0 200 


 


Output Total  350 


 


Balance 0 -150 








 











ESR  37 MM/HR (0-20)  H  11/08/18  10:00    


 


C-Reactive Protein  147.4 mg/L (<10.0)  H  11/11/18  04:20    














- Physical Exam


General Appearance: alert


EENT: pale conjunctiva, other (scab over upper lip), No scleral icterus


Respiratory: No accessory muscle use, No crackles


Neck: supple


Cardiac/Chest: regular rate, rhythm, diastolic murmur, systolic murmur


Neuro/Psych: alert, normal mood/affect, oriented x 3





- Line/s


  ** LUE PICC


Lines: No drainage, No erythema





- Time Spent With Patient


Time Spent with Patient: greater than 35 minutes (reviewed plan of care w 

patient and wife)


Time Spent with Patient: Greater than 35 minutes spent on this patients care, 

greater than 50% of time spent counseling, educating, and coordinating care 

regarding the above mentioned plan.





ICD10 Worksheet


Patient Problems: 


 Problems











Problem Status Onset


 


Endocarditis Acute  


 


Meningitis Acute

## 2018-11-14 NOTE — PDANEPAE
ANE History of Present Illness





72 yo for mvr





ANE Past Medical History





- Cardiovascular History


Hx Hypertension: Yes


Hx Arrhythmias: No


Hx Chest Pain: No


Hx Coronary Artery / Peripheral Vascular Disease: Yes


Hx CHF / Valvular Disease: Yes


Hx Palpitations: No


Cardiovascular History Comment: HIGH CHOL.  MITRAL REGURG





- Pulmonary History


Hx COPD: No


Hx Asthma/Reactive Airway Disease: No


Hx Recent Upper Respiratory Infection: No


Hx Oxygen in Use at Home: Yes


O2 in Use at Home (L/minute): 2


Hx Sleep Apnea: Yes


Sleep Apnea Screening Result - Last Documented: Positive


Pulmonary History Comment: PULM HTN.  NAVNEET MILD- O2 AT NIGHT 2 L/M.  COULDNT JAYLEEN 

CPAP.  DENIES SOB W STAIRS.  LUNG DAMAGE, NOT PROCESSING O2 CORRECTLY-

DEMINISHED VOL CAPACITY BETWEEN 25-33 PERCENT.  





- Neurologic History


Hx Cerebrovascular Accident: No


Hx Seizures: No


Hx Dementia: No


Neurologic History Comment: N AND T FEET





- Endocrine History


Hx Diabetes: No


Hypothyroid: No


Hyperthyroid: No


Obesity: mild





- Renal History


Hx Renal Disorders: No





- Liver History


Hx Hepatic Disorders: No





- Neurological & Psychiatric Hx


Hx Neurological and Psychiatric Disorders: No





- Cancer History


Hx Cancer: Yes


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





- Congenital Disorder History


Hx Congenital Disorders: No


Congenital History Comment: VEIN L LEG ABNL, FAM HX





- GI History


Hx Gastrointestinal Disorders: No





- Other Health History


Other Health History: LUPUS.  ? LYME DISEASE.  RETINAL VASCULITIS.  ANTI-CARDIO 

LIPIN ANTIBODY CLOTS X 2 IN 1994 RECENTLY RETESTED RESULTS NEG.  EYES POOR 

FOCUSING.  HAIRS HYPERPIGMENTED





- Chronic Pain History


Chronic Pain: Yes (RT SHLDR)





- Surgical History


Prior Surgeries: LT HAMSTRING REPAIR 6/2014.  [facility] ING HERNIA REP.  TONSILS AGE 

16.  CUT AS CHILD, CLAMP TO STOP VBLEEDING





ANE Review of Systems


Review of Systems: 








- Exercise capacity


METS (RN): 3 METS





ANE Patient History





- Allergies


Allergies/Adverse Reactions: 








CONTRAST DYE Allergy (Intermediate, Uncoded 11/06/18 16:40)


 Rash








- Home Medications


Home Medications: 








Aspirin EC [Aspirin EC 81 mg (*)] 81 mg PO DAILY 11/06/18 [Last Taken 11/06/18]


Atorvastatin Calcium [Lipitor 40 mg (*)] 40 mg PO HS 11/06/18 [Last Taken 11/05/ 18]


Doxycycline Hyclate 50 mg PO BID 11/06/18 [Last Taken 11/06/18 09:00]


Hydroxychloroquine Sulfate [Plaquenil 200 mg (*)] 200 mg PO BID 11/06/18 [Last 

Taken 11/06/18 09:00]


Mycophenolate Mofetil 1,000 mg PO BID 11/06/18 [Last Taken 11/06/18 09:00]


Ramipril [Altace 5mg (*)] 5 mg PO DAILY 11/06/18 [Last Taken 11/05/18]


amLODIPine BESYLATE [Norvasc 5 mg (*)] 5 mg PO DAILY 11/06/18 [Last Taken 11/05/ 18]








- NPO status


NPO Since - Liquids (Date): 11/14/18


NPO Since - Liquids (Time): 00:00


NPO Since - Solids (Date): 11/14/18


NPO Since - Solids (Time): 00:00





- Anes Hx


Anes Hx: no prior problems





- Smoking Hx


Smoking Status: Never smoked





- Family Anes Hx


Family Hx Anesthesia Complications: NONE





ANE Labs/Vital Signs





- Labs


Result Diagrams: 


 11/14/18 06:00





 11/14/18 06:00





- Vital Signs


Blood Pressure: 159/70


Heart Rate: 71


Respiratory Rate: 12


O2 Sat (%): 91


Height: 5 ft 9 in


Weight: 75.387 kg





ANE Physical Exam





- Airway


Neck exam: FROM


Mallampati Score: Class 2


Mouth exam: normal dental/mouth exam





- Pulmonary


Pulmonary: no respiratory distress





- Cardiovascular


Cardiovascular: regular rate and rhythym





- ASA Status


ASA Status: IV





ANE Anesthesia Plan


Anesthesia Plan: general endotracheal anesthesia


Lines/Monitors: arterial line, central line, ANAMARIA

## 2018-11-14 NOTE — HOSPPROG
Hospitalist Progress Note


Assessment/Plan: 





74 yo M w lupus on chronic immunosuppression here w Strep anginosus 

endocarditis w emboli








bacteremia: cleared





endocarditis: confirmed by ANAMARIA   


   MVR 11/14





CHF: cxr read as chf, looks like it, yet lying flat w no 02 requirement and 

clear exam





single vessel cad: 1 vessel cabg vs stent








lupus: restart plaquenil 


   MMF on hold





?csf infection: more likely irritation from adjacent infection given neg csf 

micro





elevated lft's: rising slowly but steadily


   may be due to ceftiaxone


   will d/w ID





   11/13: lower today


   continue ceftriaxone


   follow





pre valve replacement: cath today





splenic infarct: embolic phenomen





proph: lmwh





dispo: suspect will be transferred to CT surgery service w ID consult


   please let me know if CT surgery wishes for hospitalist to continue to follow














Subjective: cxr- pulm edema (interp by me).  cath w distal 70% RCA stenosis.  

carotids OK


Objective: 


 Vital Signs











Temp Pulse Resp BP Pulse Ox


 


 36.3 C   71   12   165/73 H  91 L


 


 11/14/18 07:19  11/14/18 07:19  11/14/18 07:19  11/14/18 07:19  11/14/18 07:19








 Microbiology











 11/08/18 10:00 Blood Culture - Final





 Blood 


 


 11/08/18 10:00 Blood Culture - Final





 Blood 








 Laboratory Results





 11/14/18 06:00 





 11/14/18 06:00 





 











 11/13/18 11/14/18 11/15/18





 05:59 05:59 05:59


 


Intake Total 0 200 


 


Output Total  350 


 


Balance 0 -150 








 











PT  14.5 SEC (12.0-15.0)   11/13/18  16:35    


 


INR  1.11  (0.83-1.16)   11/13/18  16:35    














- Physical Exam


Constitutional: no apparent distress, appears nourished


Eyes: PERRL, anicteric sclera


Ears, Nose, Mouth, Throat: moist mucous membranes, hearing normal


Cardiovascular: regular rate and rhythym, no murmur, rub, or gallop


Respiratory: no respiratory distress, no rales or rhonchi


Gastrointestinal: normoactive bowel sounds, soft, non-tender abdomen


Genitourinary: no bladder fullness, No tobar in urethra


Skin: warm, other (dark LE rash unchanged this is longstanding and permanent)


Musculoskeletal: full muscle strength


Neurologic: AAOx3





ICD10 Worksheet


Patient Problems: 


 Problems











Problem Status Onset


 


Endocarditis Acute  


 


Meningitis Acute

## 2018-11-14 NOTE — GOP
DATE OF OPERATION:  11/14/2018



SURGEON:  Beau Henry MD



ASSISTANT:  Saskia Gage, RALF.



PREOPERATIVE DIAGNOSIS:  

1.  Mitral valve endocarditis with embolization. 

2.  Single-vessel coronary artery disease.



POSTOPERATIVE DIAGNOSIS:  

1.  Mitral valve endocarditis with embolization. 

2.  Single-vessel coronary artery disease.



PROCEDURE PERFORMED:  

1.  Mitral valve replacement using a 27 mm Magna Ease bovine pericardial valve.

2.  Single-vessel coronary artery bypass grafting with saphenous vein graft from aorta to right coron
jarrod artery.

3.  Endoscopic vein harvest from the left thigh.



FINDINGS:  The pericardial space was actually fairly densely adherent.  There was evidence of previou
s intrapericardial inflammation.  The mitral valve itself had severe calcification of the mitral anul
us.  The posterior leaflet was essentially fixed, and a large calcium burden in the posterior anulus 
was identified as well as some circumferential calcium throughout the remainder of the anulus.  The a
nterior leaflet and posterior leaflet both had vegetations.





INDICATIONS:  The patient is a 73-year-old gentleman who is active and working who developed mitral v
alve endocarditis.  He has a history of immunosuppression.  The organism was strep, and he was initia
lly doing well in medical therapy, but there was __________ evidence of embolization to the spleen, a
nd the patient was recommended to undergo surgical valve replacement.



DESCRIPTION OF PROCEDURE:  Patient was taken to operating room and placed on the operating table in s
upine position.  After induction of general anesthesia and single-lumen tracheal tube intubation, pat
ient was prepped and draped sterilely.  Standard median sternotomy was performed, and saphenous vein 
was harvested from the left leg using a minimally invasive endoscopic technique.  Once the chest was 
opened via median sternotomy, we dissected out the aorta and superior vena cava, heparinized the jigna
ent and then cannulated again the aorta and superior vena cava.  Cardiopulmonary bypass was institute
d as we dissected out the remainder of the heart.  This took quite a while just due to the extensive 
intrapericardial adhesions.  The inferior vena cava was dissected.  It was cannulated then with a 28-
[place] cannula.  Next, the cross-clamp was applied, and the heart was arrested with 1 L of [place] s
olution.  The distal right coronary was dissected open, probed with a 2 mm vessel and anastomosed end
-to-side to a vein graft using running 7-0 Prolene.  Next, the left atrium was opened.  The valve was
 as described above.  The anterior and posterior leaflets were resected.  There was tremendous calcif
ication of the posterior leaflet.  It was fairly treacherous as this extended down into the left vent
ricle and was somewhat friable.  Sutures were placed around the anulus with great care in order to pr
event dislodgement of any of the calcium in an effort to minimize the risk of AV disruption.  There w
as some difficulty getting needles through some of this calcium, but ultimately the sutures were plac
ed circumferentially around the anulus and the valve was seated without difficulty.  It was secured i
n place with COR-KNOT device, and the left atrium was then closed.  The cross-clamp was then removed,
 and a partial occlusion clamp was placed.  The vein graft was anastomosed end-to-side to the ascendi
ng aorta using running 6-0 Prolene.  This was de-aired and allowed to flow freely.  The patient was t
hen  from cardiopulmonary bypass with 3 mcg of dobutamine.  This was done without difficulty
.  The post pump transesophageal echo shows a trace of a perivalvular leak in an area of extensive ca
lcification.  This was deemed to be clinically acceptable and then the protamine was administered.  T
he patient was decannulated.  The patient was clearly coagulopathic.  Two doses of platelets were giv
en in the operating room.  We then checked the INR.  It was elevated and so more fresh frozen plasma 
was also administered immediately following surgery.  Once the wounds were closed in layers and dress
ing was applied, the patient was transferred to the ICU in stable condition.





Job #:  [id number]

## 2018-11-15 LAB
INR PPP: 1.21 (ref 0.83–1.16)
PLATELET # BLD: 171 10^3/UL (ref 150–400)
PROTHROMBIN TIME: 15.5 SEC (ref 12–15)

## 2018-11-15 PROCEDURE — 30233N1 TRANSFUSION OF NONAUTOLOGOUS RED BLOOD CELLS INTO PERIPHERAL VEIN, PERCUTANEOUS APPROACH: ICD-10-PCS | Performed by: THORACIC SURGERY (CARDIOTHORACIC VASCULAR SURGERY)

## 2018-11-15 RX ADMIN — ASPIRIN SCH MG: 81 TABLET, DELAYED RELEASE ORAL at 08:15

## 2018-11-15 RX ADMIN — ONDANSETRON PRN MG: 2 SOLUTION INTRAMUSCULAR; INTRAVENOUS at 11:34

## 2018-11-15 RX ADMIN — HYDROXYCHLOROQUINE SULFATE SCH MG: 200 TABLET, FILM COATED ORAL at 22:02

## 2018-11-15 RX ADMIN — METOPROLOL TARTRATE SCH MG: 25 TABLET, FILM COATED ORAL at 21:59

## 2018-11-15 RX ADMIN — MUPIROCIN SCH DOSE: 20 OINTMENT TOPICAL at 22:03

## 2018-11-15 RX ADMIN — PANTOPRAZOLE SODIUM SCH MG: 40 TABLET, DELAYED RELEASE ORAL at 08:13

## 2018-11-15 RX ADMIN — MUPIROCIN SCH DOSE: 20 OINTMENT TOPICAL at 09:58

## 2018-11-15 RX ADMIN — DOXYCYCLINE HYCLATE SCH MG: 100 CAPSULE, GELATIN COATED ORAL at 08:13

## 2018-11-15 RX ADMIN — ACETAMINOPHEN PRN MG: 325 TABLET ORAL at 22:02

## 2018-11-15 RX ADMIN — DOXYCYCLINE HYCLATE SCH MG: 100 CAPSULE, GELATIN COATED ORAL at 22:02

## 2018-11-15 NOTE — ASMTCMCOM
CM Note

 

CM Note                       

Notes:

Pt admitted for suspected meningitis, bacteremia and cervical abscess in the setting of lupus on 

immunosupressive drugs. Pt then had open heart surgery on 11/14 for mitral valve repair and 

CABG. Pt to transfer to PCU likely today. ID following; pt on IV ABX which will likely continue 

after discharge. Referrals have been sent to [facility] and [facility] and have been accepted. PT and OT 

evals pending. Pt lives in [place] with his wife Izabella. CM to follow. 



D/C Plan: Home infusion with [facility] and [facility] v SNF pending PT/OT evals

 

Date Signed:  11/15/2018 11:25 AM

Electronically Signed By:Jessica Almodovar

## 2018-11-15 NOTE — POSTANESTH
Post Anesthetic Evaluation


Cardiovascular Status: Normal, Stable


Respiratory Status: Tx Decrease in SpO2


Level of Consciousness/Mental Status: Can Participate in Eval


Pain Control: Adequate, Prn Tx Ordered


Nausea/Vomiting Control: Adequate, Prn Tx Ordered


Complications Possibly Related to Anesthesia: None Noted

## 2018-11-15 NOTE — PCMIDPN
Assessment/Plan: 


1. 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 

C1/C2 epidural abscess postop day 1 status post bioprosthetic valve replacement 

and single-vessel CABG:


In excellent spirits today!  Doing very well.  Continue high-dose ceftriaxone 2 

g IV q.12 hours.  No new recommendations at this point in time.  Continue to 

follow safety labs as outlined below on high-dose ceftriaxone.


2.History of transaminitis:


Resolving.  No evidence clinically or lab wise for pseudocholelithiasis, which 

can be caused by ceftriaxone.


3.Petechial rash upper inner thighs:


Agree that this is likely secondary to recent contrast dye.  No evidence of 

rash elsewhere.  No eosinophilia and liver function tests coming down.  Doubt 

drug-induced.















































Subjective: 


Status post MVR with bioprosthetic valve and single-vessel CABG (SVG to RCA).  

Patient in excellent spirits today.  No complaints.





Objective: 


Ceftriaxone 2 g IV q.12 hours day 9


No fevers.  Some hypothermia documented which has resolved. Vital Signs











Temp Pulse Resp BP Pulse Ox


 


 36.5 C   73   17   139/64 H  97 


 


 11/15/18 08:00  11/15/18 09:00  11/15/18 09:00  11/15/18 09:00  11/15/18 09:00








 Microbiology











 11/14/18 14:25 Gram Stain - Final





 Heart - Tissue 


 


 11/08/18 10:00 Blood Culture - Final





 Blood 


 


 11/08/18 10:00 Blood Culture - Final





 Blood 








 Laboratory Results





 11/15/18 05:00 





 11/15/18 05:00 





 











 11/14/18 11/15/18 11/16/18





 05:59 05:59 05:59


 


Intake Total 200 2143 


 


Output Total 350 1460 


 


Balance -150 683 








 











ESR  37 MM/HR (0-20)  H  11/08/18  10:00    


 


C-Reactive Protein  147.4 mg/L (<10.0)  H  11/11/18  04:20    








Blood cultures November 8th negative





- Physical Exam


General Appearance: alert, no apparent distress


EENT: pharynx normal, No thrush


Respiratory: lungs clear


Neck: other (Right IJ in place)


Cardiac/Chest: other (Median sternotomy incision is clean, dry, and intact 

without click or tenderness.), No systolic murmur


Extremities: other (PICC line left upper extremity looks fine)


Abdomen: non-tender, soft


Skin: other (Very faint, blanching petechial rash on his upper inner thighs and 

groin area.  This is fading, per the patient.  No rash elsewhere with the 

exception of his hyperpigmentation on his lower extremities from Plaquenil), No 

embolic lesions


Neuro/Psych: oriented x 3





ICD10 Worksheet


Patient Problems: 


 Problems











Problem Status Onset


 


Endocarditis Acute  


 


Meningitis Acute

## 2018-11-15 NOTE — SOAPPROG
SOAP Progress Note


Assessment/Plan: 


Assessment: POD#1 MVR #27 Magna bioprosthesis, CABG x 1 (SV-dRCA)





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 

after blood cxs cleared. Await intraop cxs. Abx per ID. Stable early postop 

course. Optimized hemodynamics on low dose dobutamine. No dysrhythmias. No sig 

volume overload.





Lupus on chronic immunosuppression -  MMF on hold. Plaquenil to be restarted. 





Single vessel CAD - Incidental finding by preop LHC. Bypassed with venous 

conduit. Secondary prevention w baby ASA, BB as tolerated and statin when 

appropriate (LFTs normalized).





Acute expected blood loss anemia with thrombocytopenia and coagulopathy - 

Corrected with 1u Plt and 4u FFP. No evidence active bleeding. Use of PRBC prn. 











Plan:


Routine POD#1 orders re lines, wires, orals and mobility.


Stop dobutamine.


Transfuse 2u PRBC.


IV lasix prn CVP > 12.


Start metoprolol with conservative hold parameters.


Consider tx to PCU later today.





11/15/18 06:29

















Subjective: 





Comfortable seated. A little dizzy and nauseous when upright. 


Objective: 





 Vital Signs











Temp Pulse Resp BP Pulse Ox


 


 36.9 C   93   20   133/66 H  96 


 


 11/15/18 00:00  11/15/18 06:00  11/15/18 06:00  11/15/18 06:00  11/15/18 06:00








 Microbiology











 11/14/18 14:25 Gram Stain - Final





 Heart - Tissue 


 


 11/08/18 10:00 Blood Culture - Final





 Blood 


 


 11/08/18 10:00 Blood Culture - Final





 Blood 








 Laboratory Results





 11/15/18 05:00 





 11/15/18 05:00 





 











 11/14/18 11/15/18 11/16/18





 05:59 05:59 05:59


 


Intake Total 200 2143 


 


Output Total 350 1460 


 


Balance -150 683 








 











PT  15.5 SEC (12.0-15.0)  H  11/15/18  05:00    


 


INR  1.21  (0.83-1.16)  H  11/15/18  05:00    








Extubated last pm without incident. Min suppl O2 req.


Dobutamine weaned from 5 to 1 mcg overnoc.


Holding MAPs > 80, CI > 2.5, adequate UOP.


CXR-> no PTX, min pulm vasc congestion, bibasilar atelectasis.


Moderate CTOP but quality thin.


H/H appears to have plateaued. 





ICD10 Worksheet


Patient Problems: 


 Problems











Problem Status Onset


 


Endocarditis Acute  


 


Meningitis Acute

## 2018-11-15 NOTE — CPEKG
Test Reason : OPEN

Blood Pressure : ***/*** mmHG

Vent. Rate : 100 BPM     Atrial Rate : 100 BPM

   P-R Int : 177 ms          QRS Dur : 090 ms

    QT Int : 336 ms       P-R-T Axes : 019 -09 085 degrees

   QTc Int : 434 ms

 

Sinus tachycardia

Multiple ventricular premature complexes

Anteroseptal infarct, age indeterminate

Left atrial enlargement

Confirmed by Shaun Mir ([id number]) on 11/15/2018 5:04:42 PM

 

Referred By:             Confirmed By:Shaun Mir

## 2018-11-16 LAB
INR PPP: 1.21 (ref 0.83–1.16)
PLATELET # BLD: 123 10^3/UL (ref 150–400)
PROTHROMBIN TIME: 15.5 SEC (ref 12–15)

## 2018-11-16 RX ADMIN — METOPROLOL TARTRATE SCH MG: 25 TABLET, FILM COATED ORAL at 09:46

## 2018-11-16 RX ADMIN — ASPIRIN SCH MG: 81 TABLET, DELAYED RELEASE ORAL at 09:43

## 2018-11-16 RX ADMIN — MUPIROCIN SCH DOSE: 20 OINTMENT TOPICAL at 20:37

## 2018-11-16 RX ADMIN — DOXYCYCLINE HYCLATE SCH MG: 100 CAPSULE, GELATIN COATED ORAL at 09:43

## 2018-11-16 RX ADMIN — METOPROLOL TARTRATE SCH MG: 25 TABLET, FILM COATED ORAL at 20:36

## 2018-11-16 RX ADMIN — MUPIROCIN SCH DOSE: 20 OINTMENT TOPICAL at 09:44

## 2018-11-16 RX ADMIN — DOCUSATE SODIUM AND SENNOSIDES SCH TAB: 50; 8.6 TABLET ORAL at 20:36

## 2018-11-16 RX ADMIN — HYDROXYCHLOROQUINE SULFATE SCH MG: 200 TABLET, FILM COATED ORAL at 09:47

## 2018-11-16 RX ADMIN — DOXYCYCLINE HYCLATE SCH MG: 100 CAPSULE, GELATIN COATED ORAL at 20:36

## 2018-11-16 RX ADMIN — HYDROXYCHLOROQUINE SULFATE SCH MG: 200 TABLET, FILM COATED ORAL at 20:36

## 2018-11-16 RX ADMIN — PANTOPRAZOLE SODIUM SCH MG: 40 TABLET, DELAYED RELEASE ORAL at 09:47

## 2018-11-16 RX ADMIN — DOCUSATE SODIUM AND SENNOSIDES SCH TAB: 50; 8.6 TABLET ORAL at 09:47

## 2018-11-16 NOTE — SOAPPROG
SOAP Progress Note


Assessment/Plan: 


Assessment: POD#2 MVR #27 Magna bioprosthesis, CABG x 1 (SV-dRCA)





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 

after blood cxs cleared. Veg  no org. Await valve cx result. Abx via PICC 

under ID direction. Stable early postop course. Optimized hemodynamics on low 

dose dobutamine night of surgery. No dysrhythmias. No sig volume overload. 

Antithrombotic prophylaxis with coumadin x 2 mo, target INR 2-3. AF prophylaxis 

with BB, uptitrated as tolerated.





Lupus on chronic immunosuppression -  MMF on hold. Plaquenil restarted. 





Single vessel CAD - Incidental finding by preop LHC. Bypassed with venous 

conduit. Secondary prevention w baby ASA, BB as tolerated, and statin when 

appropriate (LFTs normalized).





Acute expected blood loss anemia with thrombocytopenia and coagulopathy - 

Corrected with 1u Plt, 4u FFP, and 2u PRBC. VTE prophylaxis with SCDs and 

coumadin until INR > 1.7.











Plan:


Cont metoprolol 25 mg BID.


Cont gentle diuresis.


Consider chest tube removal later today.


Start coumadin.


D/C QLC.


Consider Vwire removal tomorrow.


Baseline postop echo.


Dispo - Anticipate home +/- C in 2days














11/16/18 08:48




















Subjective: 





Doing well. Improving stamina and appetite. Hopeful for chest tube removal 

today.


Objective: 





 Vital Signs











Temp Pulse Resp BP Pulse Ox


 


 36.8 C   69   14   142/89 H  98 


 


 11/16/18 07:12  11/16/18 07:12  11/16/18 07:12  11/16/18 07:12  11/16/18 07:12








 Microbiology











 11/14/18 14:25 Gram Stain - Final





 Heart - Tissue 


 


 11/14/18 14:25 Mycobacterial Smear (HUMBLE) - Final





 Heart - Tissue 








 Laboratory Results





 11/16/18 05:25 





 11/16/18 05:25 





 











 11/15/18 11/16/18 11/17/18





 05:59 05:59 05:59


 


Intake Total 2143 2775 


 


Output Total 1460 1740 300


 


Balance 683 1035 -300








 











PT  15.5 SEC (12.0-15.0)  H  11/16/18  05:25    


 


INR  1.21  (0.83-1.16)  H  11/16/18  05:25    








Improved HR control on low dose BB.


Uptrending SBPs.


Min suppl O2 req.


Positive fluid balance. +3kg overall.


CTOP approaching removal criteria.


Labs ok.








Physical Exam





- Physical Exam


General Appearance: alert, no apparent distress


Respiratory: lungs clear (grossly), other (CTs x 2 yd to pleurovac, serosang 

drainage, no air leak)


Cardiac/Chest: other (Sternotomy CDI. Vwires intact.)


Abdomen: normal bowel sounds, non-tender, soft


Skin: warm/dry


Extremities: swelling (trace), other (LLE venotomy CDI)





ICD10 Worksheet


Patient Problems: 


 Problems











Problem Status Onset


 


Endocarditis Acute  


 


Meningitis Acute

## 2018-11-16 NOTE — PCMIDPN
Assessment/Plan: 


1. 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 

C1/C2 epidural abscess postop day 2 status post bioprosthetic valve replacement 

and single-vessel CABG:


Stable.  Continue high-dose ceftriaxone 2 g IV q.12 hours.  No new 

recommendations at this point in time.  Continue to follow safety labs as 

outlined below on high-dose ceftriaxone.  Suspect right IJ can be discontinued.


2.History of transaminitis:


Resolving.  No evidence clinically or lab wise for pseudocholelithiasis, which 

can be caused by ceftriaxone.


3.Petechial rash upper inner thighs:


Practically gone.
























































Subjective: 


In good spirits.  Watching TV.  Wants the chest tube out.





Objective: 


Ceftriaxone 2 g IV q.12 hours day 10


T-max 37.1 degrees Vital Signs











Temp Pulse Resp BP Pulse Ox


 


 36.6 C   65   20   139/79 H  98 


 


 11/16/18 11:08  11/16/18 11:08  11/16/18 11:08  11/16/18 11:08  11/16/18 11:08








 Microbiology











 11/14/18 14:25 Gram Stain - Final





 Heart - Tissue 


 


 11/14/18 14:25 Mycobacterial Smear (HUBMLE) - Final





 Heart - Tissue 








 Laboratory Results





 11/16/18 05:25 





 11/16/18 05:25 





 











 11/15/18 11/16/18 11/17/18





 05:59 05:59 05:59


 


Intake Total 2143 2775 220


 


Output Total 1460 1740 700


 


Balance 683 1035 -480








 











ESR  37 MM/HR (0-20)  H  11/08/18  10:00    


 


C-Reactive Protein  147.4 mg/L (<10.0)  H  11/11/18  04:20    








Heart tissue culture negative


Mitral valve pathology pending





- Physical Exam


General Appearance: alert, no apparent distress


EENT: pharynx normal, No thrush


Respiratory: other (Decreased breath sounds bilaterally)


Cardiac/Chest: regular rate, rhythm, other (Median sternotomy incision clean, 

dry, intact with no click or erythema), No systolic murmur


Extremities: other (PICC line left arm, right IJ)


Abdomen: non-tender, soft


Skin: No embolic lesions


Neuro/Psych: oriented x 3





ICD10 Worksheet


Patient Problems: 


 Problems











Problem Status Onset


 


Endocarditis Acute  


 


Meningitis Acute

## 2018-11-17 LAB
INR PPP: 1.19 (ref 0.83–1.16)
PLATELET # BLD: 104 10^3/UL (ref 150–400)
PROTHROMBIN TIME: 15.3 SEC (ref 12–15)

## 2018-11-17 RX ADMIN — ASPIRIN SCH MG: 81 TABLET, DELAYED RELEASE ORAL at 10:31

## 2018-11-17 RX ADMIN — DOCUSATE SODIUM AND SENNOSIDES SCH TAB: 50; 8.6 TABLET ORAL at 10:30

## 2018-11-17 RX ADMIN — DOCUSATE SODIUM AND SENNOSIDES SCH TAB: 50; 8.6 TABLET ORAL at 20:10

## 2018-11-17 RX ADMIN — CLOTRIMAZOLE SCH MG: 10 LOZENGE ORAL; TOPICAL at 18:54

## 2018-11-17 RX ADMIN — DOXYCYCLINE HYCLATE SCH MG: 100 CAPSULE, GELATIN COATED ORAL at 20:07

## 2018-11-17 RX ADMIN — METOPROLOL TARTRATE SCH MG: 25 TABLET, FILM COATED ORAL at 10:31

## 2018-11-17 RX ADMIN — CLOTRIMAZOLE SCH MG: 10 LOZENGE ORAL; TOPICAL at 20:55

## 2018-11-17 RX ADMIN — MUPIROCIN SCH DOSE: 20 OINTMENT TOPICAL at 10:33

## 2018-11-17 RX ADMIN — ATORVASTATIN CALCIUM SCH: 40 TABLET, FILM COATED ORAL at 20:58

## 2018-11-17 RX ADMIN — PANTOPRAZOLE SODIUM SCH MG: 40 TABLET, DELAYED RELEASE ORAL at 10:32

## 2018-11-17 RX ADMIN — Medication SCH EA: at 18:54

## 2018-11-17 RX ADMIN — METOPROLOL TARTRATE SCH MG: 25 TABLET, FILM COATED ORAL at 20:08

## 2018-11-17 RX ADMIN — CLOTRIMAZOLE SCH: 10 LOZENGE ORAL; TOPICAL at 18:54

## 2018-11-17 RX ADMIN — HYDROXYCHLOROQUINE SULFATE SCH MG: 200 TABLET, FILM COATED ORAL at 20:07

## 2018-11-17 RX ADMIN — DOXYCYCLINE HYCLATE SCH MG: 100 CAPSULE, GELATIN COATED ORAL at 10:32

## 2018-11-17 RX ADMIN — HYDROXYCHLOROQUINE SULFATE SCH MG: 200 TABLET, FILM COATED ORAL at 10:30

## 2018-11-17 NOTE — PCMIDPN
Assessment/Plan: 


1. 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 

C1/C2 epidural abscess postop day 3 status post bioprosthetic valve replacement 

and single-vessel CABG:


Stable.  Continue high-dose ceftriaxone 2 g IV q.12 hours. Continue to follow 

safety labs as outlined below on high-dose ceftriaxone.  Will need repeat 

imaging of his neck moving forward to evaluate for resolution of the epidural 

abscess.  If continues to have extreme discomfort with weight-bearing, will 

likely need to have his psoas area reimaged as well.  As outlined previously by 

Dr. Benson, tentative stop date of antibiotics 1/3/2019.


2.History of transaminitis:


Improved.  No evidence clinically or lab wise for pseudocholelithiasis, which 

can be caused by ceftriaxone.


3.Petechial rash upper inner thighs:


Gone.


4. Oral candidiasis:


No esophageal symptoms.  Start clotrimazole troches 5 times per day.


5. Disposition:


The patient's wife expressed to me that she is quite concerned she cannot care 

for him at home, given the amount of attention he needs.  Will convey this to 

social work.  





Over 25 min spent with this patient today



























































11/17/18 13:52





Subjective: 


Watching TV with his wife and son.  No complaints.  Chest tube removed.  Right 

IJ removed.  (Thank you CT surgery!) 


Patient's wife is very concerned about her ability to care for him at home.  

Continues to have pain in his left hip area when he bears weight.  No diarrhea.








Objective: 


Ceftriaxone 2 g IV q.12 hours day 11


T-max 37.2 degrees Vital Signs











Temp Pulse Resp BP Pulse Ox


 


 36.5 C   58 L  19   123/69 H  100 


 


 11/17/18 04:00  11/17/18 11:47  11/17/18 11:47  11/17/18 11:47  11/17/18 11:47








 Microbiology











 11/14/18 14:25 Gram Stain - Final





 Heart - Tissue 








 Laboratory Results





 11/17/18 05:45 





 11/17/18 05:45 





 











 11/16/18 11/17/18 11/18/18





 05:59 05:59 05:59


 


Intake Total 2775 1070 


 


Output Total 1740 1700 


 


Balance 1035 -630 








 











ESR  37 MM/HR (0-20)  H  11/08/18  10:00    


 


C-Reactive Protein  147.4 mg/L (<10.0)  H  11/11/18  04:20    








Card tissue remains no growth





- Physical Exam


General Appearance: alert, no apparent distress


EENT: thrush


Respiratory: lungs clear


Cardiac/Chest: regular rate, rhythm, No systolic murmur


Extremities: other (PICC line left upper extremity looks fine)


Abdomen: non-tender, soft


Skin: No rash





ICD10 Worksheet


Patient Problems: 


 Problems











Problem Status Onset


 


Endocarditis Acute  


 


Meningitis Acute

## 2018-11-17 NOTE — SOAPPROG
SOAP Progress Note


Assessment/Plan: 


Assessment: POD#3 MVR #27 Magna bioprosthesis, CABG x 1 (SV-dRCA) for IE





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 

after blood cxs cleared. Veg  no org. Await valve cx result. Abx via PICC 

under ID direction. Stable early postop course. Antithrombotic prophylaxis with 

coumadin x 2 mo, target INR 2-3 (today 1.19). Repeat coumadin today at 2.5 mg. 

AF prophylaxis with BB, uptitrated as tolerated.





Lupus on chronic immunosuppression -  MMF on hold. Plaquenil restarted. 





Single vessel CAD - Incidental finding by preop LHC. Bypassed with venous 

conduit. Secondary prevention w baby ASA, BB as tolerated, and statin when 

appropriate (LFTs normalized).





Acute expected blood loss anemia with thrombocytopenia and coagulopathy - 

Corrected with 1u Plt, 4u FFP, and 2u PRBC. VTE prophylaxis with SCDs and 

coumadin until INR > 1.7. Hct dropped from 25-22 today -> d/w Dr. Henry - Plan 

to give 1u pRBC.











Plan:


Cont gentle diuresis 


Coumadin 2.5 mg PO today, repeat INR tomorrow


V wire out today.


Baseline postop echo.


1 prbc for anemia hct 22 


Repeat CBC tomorrow AM.


Dispo - Anticipate home +/- HHC tomorrow or Monday











Subjective: 





Doing well. Minimal complaints. 


Objective: 





 Vital Signs











Temp Pulse Resp BP Pulse Ox


 


 36.5 C   64   14   127/68 H  99 


 


 11/17/18 04:00  11/17/18 04:00  11/17/18 04:00  11/17/18 04:00  11/17/18 04:00








 Microbiology











 11/14/18 14:25 Gram Stain - Final





 Heart - Tissue 








 Laboratory Results





 11/17/18 05:45 





 11/17/18 05:45 





 











 11/16/18 11/17/18 11/18/18





 05:59 05:59 05:59


 


Intake Total 2775 1070 


 


Output Total 1740 1700 


 


Balance 1035 -630 








 











PT  15.3 SEC (12.0-15.0)  H  11/17/18  05:45    


 


INR  1.19  (0.83-1.16)  H  11/17/18  05:45    








General: NAD, sitting upright


HEENT: PICC, MMM


Resp:  no wheezes, crackles


Cardiac: NSR, no m/r/g, no edema


GI: soft, nt, nd


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 


Incisions: sternum - clean, dry, intact; right thigh - clean, dry, intact





DVT prophylaxis: SCDs, Coumadin 


Arterial Line: removed at transfer


Foster: out


Antibiotics within 48 hours: yes


CVL: yes, need for ongoing management, labs


Chest tubes: removed


Wires: out today





ICD10 Worksheet


Patient Problems: 


 Problems











Problem Status Onset


 


Endocarditis Acute  


 


Meningitis Acute

## 2018-11-18 LAB
INR PPP: 1.15 (ref 0.83–1.16)
PLATELET # BLD: 129 10^3/UL (ref 150–400)
PROTHROMBIN TIME: 14.9 SEC (ref 12–15)

## 2018-11-18 RX ADMIN — DOXYCYCLINE HYCLATE SCH MG: 100 CAPSULE, GELATIN COATED ORAL at 20:04

## 2018-11-18 RX ADMIN — DOXYCYCLINE HYCLATE SCH MG: 100 CAPSULE, GELATIN COATED ORAL at 10:29

## 2018-11-18 RX ADMIN — DOCUSATE SODIUM AND SENNOSIDES SCH TAB: 50; 8.6 TABLET ORAL at 10:29

## 2018-11-18 RX ADMIN — CLOTRIMAZOLE SCH MG: 10 LOZENGE ORAL; TOPICAL at 17:47

## 2018-11-18 RX ADMIN — DOCUSATE SODIUM AND SENNOSIDES SCH TAB: 50; 8.6 TABLET ORAL at 20:04

## 2018-11-18 RX ADMIN — CLOTRIMAZOLE SCH MG: 10 LOZENGE ORAL; TOPICAL at 10:29

## 2018-11-18 RX ADMIN — METOPROLOL TARTRATE SCH MG: 25 TABLET, FILM COATED ORAL at 10:28

## 2018-11-18 RX ADMIN — HYDROXYCHLOROQUINE SULFATE SCH MG: 200 TABLET, FILM COATED ORAL at 10:28

## 2018-11-18 RX ADMIN — PANTOPRAZOLE SODIUM SCH MG: 40 TABLET, DELAYED RELEASE ORAL at 10:28

## 2018-11-18 RX ADMIN — CLOTRIMAZOLE SCH MG: 10 LOZENGE ORAL; TOPICAL at 15:21

## 2018-11-18 RX ADMIN — MENTHOL SCH PATCH: 1 SPRAY TOPICAL at 10:28

## 2018-11-18 RX ADMIN — CLOTRIMAZOLE SCH MG: 10 LOZENGE ORAL; TOPICAL at 20:05

## 2018-11-18 RX ADMIN — METOPROLOL TARTRATE SCH MG: 25 TABLET, FILM COATED ORAL at 20:04

## 2018-11-18 RX ADMIN — ATORVASTATIN CALCIUM SCH MG: 40 TABLET, FILM COATED ORAL at 20:05

## 2018-11-18 RX ADMIN — ASPIRIN SCH MG: 81 TABLET, DELAYED RELEASE ORAL at 10:29

## 2018-11-18 RX ADMIN — Medication SCH: at 15:34

## 2018-11-18 RX ADMIN — CLOTRIMAZOLE SCH MG: 10 LOZENGE ORAL; TOPICAL at 05:44

## 2018-11-18 RX ADMIN — HYDROXYCHLOROQUINE SULFATE SCH MG: 200 TABLET, FILM COATED ORAL at 20:05

## 2018-11-18 NOTE — SOAPPROG
SOAP Progress Note


Assessment/Plan: 


Assessment: POD#4 MVR #27 Magna bioprosthesis, CABG x 1 (SV-dRCA) for IE





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 

after blood cxs cleared. Veg  no org. Await valve cx result. Abx via PICC 

under ID direction. Stable early postop course. Antithrombotic prophylaxis with 

coumadin x 2 mo, target INR 2-3 (today 1.15, down from 1.19). Repeat coumadin 

today at 5 mg. AF prophylaxis with BB, uptitrated as tolerated.





Lupus on chronic immunosuppression -  MMF on hold. Plaquenil restarted. 





Single vessel CAD - Incidental finding by preop LHC. Bypassed with venous 

conduit. Secondary prevention w baby ASA, BB. Restart statin when LFTs 

normalize.





Acute expected blood loss anemia with thrombocytopenia and coagulopathy - 

Corrected with 1u Plt, 4u FFP, and 2u PRBC. VTE prophylaxis with SCDs and 

coumadin until INR > 1.7. 1U pRBC for Hct 22 yesterday. 27.5 today.








Plan:


Coumadin 5 mg PO today, repeat INR tomorrow


Baseline postop echo either today or tomorrow


Anticipate home with [facility] vs. SNF given patient's wife safety concerns








Subjective: 





No complaints. Dr. Diaz's note stated wife has significant concerns about 

discharge. PT note rec home with [facility]. Wife not in room during 

rounds. 


Objective: 





 Vital Signs











Temp Pulse Resp BP Pulse Ox


 


 37.1 C   60   19   128/67 H  99 


 


 11/18/18 08:00  11/18/18 08:00  11/18/18 08:00  11/18/18 08:00  11/18/18 08:00








 Microbiology











 11/14/18 14:25 Gram Stain - Final





 Heart - Tissue 








 Laboratory Results





 11/18/18 05:55 





 11/17/18 05:45 





 











 11/17/18 11/18/18 11/19/18





 05:59 05:59 05:59


 


Intake Total 1070 400 


 


Output Total 1700 300 


 


Balance -630 100 








 











PT  14.9 SEC (12.0-15.0)   11/18/18  05:55    


 


INR  1.15  (0.83-1.16)   11/18/18  05:55    








General: NAD, sitting upright


HEENT: PICC, MMM


Resp:  no wheezes, crackles


Cardiac: NSR, no m/r/g, no edema


GI: soft, nt, nde


Incisions: sternum - clean, dry, intact; right thigh - clean, dry, intact





DVT prophylaxis: SCDs, Coumadin 


Arterial Line: removed at transfer


Foster: out


Antibiotics within 48 hours: yes


CVL: yes, need for ongoing management, labs


Chest tubes: removed


Wires: removed





ICD10 Worksheet


Patient Problems: 


 Problems











Problem Status Onset


 


Endocarditis Acute  


 


Meningitis Acute

## 2018-11-18 NOTE — ASMTCMCOM
HARMAN Note

 

CM Note                       

Notes:

I met with patient and wife. Wife is concerned about her ability to care for patient when he is 

discharged. We talked about SNF rehab v hiring private duty care at home. She will visit [facility] 

and [facility] today and let us know if that is what she wants. I sent referrals to both 

facilities; they will have to request insurance authorization from [facility] if patient chooses to 

admit. We will follow up with patient and wife first thing tomorrow since CT surgery is eager to 

discharge. 

 

Date Signed:  11/18/2018 01:45 PM

Electronically Signed By:Amanda Lanier RN

## 2018-11-18 NOTE — PCMIDPN
Assessment/Plan: 


1. 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 

left psoas myositis and C1/C2 epidural abscess postop day 4 status post 

bioprosthetic valve replacement and single-vessel CABG:


Patient continues to have weakness when he stands, and some discomfort in his 

left pelvis.  Will repeat MRI of the pelvis tomorrow to further evaluate the 

left psoas area.  Will also repeat C-spine MRI while he is down there to look 

at the prevertebral abscess at C1-C2.  Continue high-dose ceftriaxone.  

Tolerating this well with stable liver function tests and no evidence of 

pseudocholelithiasis.  As outlined previously by Dr. Benson, tentative stop 

date of antibiotics 1/3/2019.


2.History of transaminitis:


Improved.  Follow liver function tests as per the above on ceftriaxone.


3. Oral candidiasis:


No esophageal symptoms.  Continue clotrimazole troches 5 times per day.


5. Disposition:


Spoke with the patient's wife today as well as the patient.  He is agreeable to 

a skilled nursing facility and this is in the works.







































































Subjective: 


Continues to have some discomfort in his left hemipelvis/hip when he stands.  

No fevers, otherwise feeling well.  Wife present in the room, who corroborates 

the fact that she would like him in a skilled nursing facility.  Patient is 

agreeable.  No diarrhea.





Objective: 


Ceftriaxone 2 g IV q.12 hours day 12


T-max 37.1 degrees Vital Signs











Temp Pulse Resp BP Pulse Ox


 


 37.1 C   63   19   114/57 L  94 


 


 11/18/18 11:40  11/18/18 11:40  11/18/18 11:40  11/18/18 11:40  11/18/18 11:40








 Microbiology











 11/14/18 14:25 Gram Stain - Final





 Heart - Tissue 








 Laboratory Results





 11/18/18 05:55 





 11/17/18 05:45 





 











 11/17/18 11/18/18 11/19/18





 05:59 05:59 05:59


 


Intake Total 1070 400 


 


Output Total 1700 300 300


 


Balance -630 100 -300








 











ESR  37 MM/HR (0-20)  H  11/08/18  10:00    


 


C-Reactive Protein  147.4 mg/L (<10.0)  H  11/11/18  04:20    








No new microbiology, heart tissue remains sterile





- Physical Exam


General Appearance: alert, no apparent distress


EENT: other (Crusting in his philthrum, does not look herpetic), No thrush


Respiratory: lungs clear


Cardiac/Chest: regular rate, rhythm, No systolic murmur


Extremities: other (PICC line left upper extremity looks fine)


Abdomen: non-tender, soft


Skin: No rash, No embolic lesions





ICD10 Worksheet


Patient Problems: 


 Problems











Problem Status Onset


 


Endocarditis Acute  


 


Meningitis Acute

## 2018-11-19 LAB
INR PPP: 1.37 (ref 0.83–1.16)
PROTHROMBIN TIME: 17 SEC (ref 12–15)

## 2018-11-19 RX ADMIN — CLOTRIMAZOLE SCH MG: 10 LOZENGE ORAL; TOPICAL at 21:23

## 2018-11-19 RX ADMIN — CLOTRIMAZOLE SCH MG: 10 LOZENGE ORAL; TOPICAL at 07:36

## 2018-11-19 RX ADMIN — ASPIRIN SCH MG: 81 TABLET, DELAYED RELEASE ORAL at 08:26

## 2018-11-19 RX ADMIN — DOXYCYCLINE HYCLATE SCH MG: 100 CAPSULE, GELATIN COATED ORAL at 20:20

## 2018-11-19 RX ADMIN — DOCUSATE SODIUM AND SENNOSIDES SCH TAB: 50; 8.6 TABLET ORAL at 20:21

## 2018-11-19 RX ADMIN — ATORVASTATIN CALCIUM SCH MG: 40 TABLET, FILM COATED ORAL at 20:21

## 2018-11-19 RX ADMIN — Medication SCH EA: at 14:07

## 2018-11-19 RX ADMIN — MENTHOL SCH PATCH: 1 SPRAY TOPICAL at 08:27

## 2018-11-19 RX ADMIN — METOPROLOL TARTRATE SCH MG: 25 TABLET, FILM COATED ORAL at 20:22

## 2018-11-19 RX ADMIN — DOXYCYCLINE HYCLATE SCH MG: 100 CAPSULE, GELATIN COATED ORAL at 08:26

## 2018-11-19 RX ADMIN — METOPROLOL TARTRATE SCH MG: 25 TABLET, FILM COATED ORAL at 08:26

## 2018-11-19 RX ADMIN — HYDROXYCHLOROQUINE SULFATE SCH MG: 200 TABLET, FILM COATED ORAL at 08:27

## 2018-11-19 RX ADMIN — CLOTRIMAZOLE SCH MG: 10 LOZENGE ORAL; TOPICAL at 17:52

## 2018-11-19 RX ADMIN — DOCUSATE SODIUM AND SENNOSIDES SCH TAB: 50; 8.6 TABLET ORAL at 08:27

## 2018-11-19 RX ADMIN — HYDROXYCHLOROQUINE SULFATE SCH MG: 200 TABLET, FILM COATED ORAL at 20:21

## 2018-11-19 RX ADMIN — PANTOPRAZOLE SODIUM SCH MG: 40 TABLET, DELAYED RELEASE ORAL at 08:27

## 2018-11-19 RX ADMIN — CLOTRIMAZOLE SCH MG: 10 LOZENGE ORAL; TOPICAL at 11:39

## 2018-11-19 RX ADMIN — CLOTRIMAZOLE SCH MG: 10 LOZENGE ORAL; TOPICAL at 14:06

## 2018-11-19 NOTE — ASMTCMCOM
CM Note

 

CM Note                       

Notes:

D/W PA, patient appropriate for SNF at this time.  Met with patient and spoke with patient's wife 

via phone #[phone], [facility], SANDRA Melvin okay with this SNF 

plan.  Updated therapy notes sent to christina Perea for Barb requesting they start insurance 

auth.  CM will follow.



Plan:  [facility] when auth rec'd.

 

Date Signed:  11/19/2018 12:12 PM

Electronically Signed By:Lolis Lima RN

## 2018-11-19 NOTE — SOAPPROG
SOAP Progress Note


Assessment/Plan: 


Assessment: POD#5 MVR #27 Magna bioprosthesis, CABG x 1 (SV-dRCA) for IE





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 

after blood cxs cleared. Veg  no org. Await valve cx result. Abx via PICC 

under ID direction. Stable early postop course. Antithrombotic prophylaxis with 

coumadin x 2 mo, target INR 2-3 (today 1.37). Repeat coumadin today at 5 mg. AF 

prophylaxis with BB - heart rate and rhythm stable. 





Lupus on chronic immunosuppression -  MMF on hold. Plaquenil restarted. 





Single vessel CAD - Incidental finding by preop LHC. Bypassed with venous 

conduit. Secondary prevention w baby ASA, BB. Restart statin when LFTs 

normalize.





Acute expected blood loss anemia with thrombocytopenia and coagulopathy - 

Corrected with 1u Plt, 4u FFP, and 2u PRBC. VTE prophylaxis with SCDs and 

coumadin until INR > 1.7. 1U pRBC for Hct 22 11/17.





Left Hip/Neck Pain - similar to presenting symptoms; rechecking MRI C-Spine and 

pelvis per ID





Plan:


Coumadin 5 mg PO today, repeat INR tomorrow


Baseline postop echo today 


MRI C-Spine & Left Hip WWO today per ID 


Anticipate home SNF given patient's wife safety concerns, awaiting approval 














Subjective: 





Doing well today. Some pain in neck and hip. 


Objective: 





 Vital Signs











Temp Pulse Resp BP Pulse Ox


 


 36.3 C   63   20   129/70 H  100 


 


 11/19/18 07:24  11/19/18 07:24  11/19/18 07:24  11/19/18 07:24  11/19/18 07:24








 Laboratory Results





 11/18/18 05:55 





 11/17/18 05:45 





 











 11/18/18 11/19/18 11/20/18





 05:59 05:59 05:59


 


Intake Total 400 810 


 


Output Total 300 925 


 


Balance 100 -115 








 











PT  17.0 SEC (12.0-15.0)  H  11/19/18  06:00    


 


INR  1.37  (0.83-1.16)  H  11/19/18  06:00    








General: NAD, sitting upright


HEENT: PICC, MMM


Resp:  no wheezes, crackles


Cardiac: NSR, no m/r/g, no edema


GI: soft, nt, nde


Incisions: sternum - clean, dry, intact; EVH thigh - clean, dry, intact





DVT prophylaxis: SCDs, Coumadin 


Arterial Line: removed


Foster: removed


Antibiotics within 48 hours: yes


CVL: PICC - needed for long term abx


Chest tubes: removed


Wires: removed





ICD10 Worksheet


Patient Problems: 


 Problems











Problem Status Onset


 


Acute blood loss anemia Acute  


 


Endocarditis Acute  


 


Meningitis Acute  


 


S/P CABG x 1 Acute  


 


S/P MVR (mitral valve replacement) Acute

## 2018-11-19 NOTE — PDIAF
- Diagnosis


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


Code Status: Full Code





- Medication Management


Long Term Antibiotics: ceftriaxone 2gm IV q12h


Long Term Antibiotic Stop Date: 01/03/19


Discharge Medications: electronically signed and located in the Home Medication 

List.


PICC Care - Routine: Yes





- Orders


Services needed: Home Care, Registered Nurse


Home Care Face to Face: I certify that this patient was under my care and that 

I had the required face-to-face encounter meeting the encounter requirements on 

the discharge day.  My findings support the fact that the patient is homebound 

as defined in


Home Care Face to Face Continued: CMS Chapter 7 Medicare Benefits Manual 30.1.1

, The condition of the patient is such that there exists a normal inability to 

leave home and consequently, leaving home would require a considerable and 

taxing effort.


Isolation Type: None


Diet Recommendation: cardiac -low fat low salt


Diet Texture: Regular Texture Diet, Thin Liquids, Meds Whole w/Liquids





- Labs/Radiology


CBC w/diff Date: 11/19/18 (Weekly Monday)


CMP Date: 11/19/18 (Weekly Monday)


CRP Date: 11/19/18 (Weekly Monday)


Call or Fax Lab and Imaging Results to: Carmelo Nieto MD [facility] at fax [phone]





- Follow Up Care


Current Providers and Referrals: 


MAI SANTIAGO [Primary Care Provider] - As per Instructions


Beau Henry MD [Medical Doctor] - 11/27/18 10:00 am


Carmelo Nieto MD [Medical Doctor] - 11/27/18 11:00 am

## 2018-11-19 NOTE — PCMIDPN
Assessment/Plan: 





Assessment/Plan:


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 

prevertebral abscess, left iliacus/psoas myositis, and splenic emboli status 

post mitral valve replacement:  Repeat blood cultures and valve culture show no 

growth to date.  Repeat MRI of C-spine shows improved prevertebral edema with 

resolution of abscess and minimal anterior epidural inflammatory change.  

Pelvic MRI shows improving left iliopsoas myositis with residual myositis in 

the iliacus muscle as well as some presacral edema which likely explain 

persistent left pelvic pain.  Continue ceftriaxone 2 g IV q.12 hours given 

presence of epidural enhancement with anticipated 8 week course of therapy due 

to continue through 1/3/2019.


* Elevated LFTs:  Improved over time.  Continue to monitor while on ceftriaxone 

on a weekly basis.


* Immunosuppression due to use of CellCept.





11/19/18 14:58





Subjective: 





Patient complains of left flank pain along belt line which is worse with 

movement.  Neck pain significantly decreased.


Objective: 


 Vital Signs











Temp Pulse Resp BP Pulse Ox


 


 36.6 C   64   17   116/51 L  95 


 


 11/19/18 14:53  11/19/18 14:53  11/19/18 14:53  11/19/18 14:53  11/19/18 14:53








 Microbiology











 11/14/18 14:25 Gram Stain - Final





 Heart - Tissue 








 Laboratory Results





 11/18/18 05:55 





 11/17/18 05:45 





 











 11/18/18 11/19/18 11/20/18





 05:59 05:59 05:59


 


Intake Total 400 810 50


 


Output Total 300 925 200


 


Balance 100 -115 -150








 











ESR  37 MM/HR (0-20)  H  11/08/18  10:00    


 


C-Reactive Protein  147.4 mg/L (<10.0)  H  11/11/18  04:20    








Ceftriaxone # 13


Mitral valve culture no growth


MRI of C-spine and pelvis overall improved with resolution of abscess at C1-2 

with some residual myositis in pelvis present but no focal abscess


 Laboratory Tests











  11/17/18





  05:45


 


Total Bilirubin  0.5


 


AST  63 H


 


ALT  78 H


 


Alkaline Phosphatase  54














- Physical Exam


General Appearance: alert, no apparent distress


EENT: No scleral icterus, No conjunctival petechiae


Respiratory: lungs clear, No respiratory distress


Cardiac/Chest: regular rate, rhythm, other (Sternotomy without erythema or 

drainage)


Extremities: other (Pain with flexion of left hip)


Abdomen: non-tender, No distended


Skin: No rash





- Line/s


  ** LUE PICC


Lines: No drainage, No erythema





- Time Spent With Patient


Time Spent with Patient: greater than 35 minutes


Time Spent with Patient: Greater than 35 minutes spent on this patients care, 

greater than 50% of time spent counseling, educating, and coordinating care 

regarding the above mentioned plan.





ICD10 Worksheet


Patient Problems: 


 Problems











Problem Status Onset


 


Acute blood loss anemia Acute  


 


Endocarditis Acute  


 


Meningitis Acute  


 


S/P CABG x 1 Acute  


 


S/P MVR (mitral valve replacement) Acute

## 2018-11-19 NOTE — ASMTCMCOM
CM Note

 

CM Note                       

Notes:

HARMAN spoke with Barb at [facility]. Auth rec'd. Notified PA, will plan for D/C on Tuesday. Shanika at 

[facility] updated on pt's discharge plan. CM will follow.



Plan: [facility] on Tuesday.

 

Date Signed:  11/19/2018 02:53 PM

Electronically Signed By:Lolis Lima RN

## 2018-11-20 VITALS — SYSTOLIC BLOOD PRESSURE: 131 MMHG | DIASTOLIC BLOOD PRESSURE: 73 MMHG

## 2018-11-20 LAB
INR PPP: 1.94 (ref 0.83–1.16)
PROTHROMBIN TIME: 22.2 SEC (ref 12–15)

## 2018-11-20 RX ADMIN — HYDROXYCHLOROQUINE SULFATE SCH MG: 200 TABLET, FILM COATED ORAL at 08:35

## 2018-11-20 RX ADMIN — CLOTRIMAZOLE SCH MG: 10 LOZENGE ORAL; TOPICAL at 06:12

## 2018-11-20 RX ADMIN — METOPROLOL TARTRATE SCH MG: 25 TABLET, FILM COATED ORAL at 08:35

## 2018-11-20 RX ADMIN — CLOTRIMAZOLE SCH MG: 10 LOZENGE ORAL; TOPICAL at 09:12

## 2018-11-20 RX ADMIN — DOCUSATE SODIUM AND SENNOSIDES SCH TAB: 50; 8.6 TABLET ORAL at 08:36

## 2018-11-20 RX ADMIN — PANTOPRAZOLE SODIUM SCH MG: 40 TABLET, DELAYED RELEASE ORAL at 08:36

## 2018-11-20 RX ADMIN — DOXYCYCLINE HYCLATE SCH MG: 100 CAPSULE, GELATIN COATED ORAL at 08:35

## 2018-11-20 RX ADMIN — MENTHOL SCH PATCH: 1 SPRAY TOPICAL at 08:35

## 2018-11-20 RX ADMIN — ASPIRIN SCH MG: 81 TABLET, DELAYED RELEASE ORAL at 08:35

## 2018-11-20 NOTE — ASMTCMCOM
CM Note

 

CM Note                       

Notes:

Spoke w/MD, pt will need IV abx at dc, may go home tomorrow. Referral sent to [facility], pt is 

otherwise independent, CM w/f.



DC Plan: [facility]

 

Date Signed:  11/20/2018 10:38 AM

Electronically Signed By:Indiana Yang

## 2018-11-20 NOTE — ASMTDCNOTE
Case Management Discharge

 

Discharge Order Complete?     Answers:  Yes                                   

Patient to Obtain             Answers:  Other                         Notes:  [facility]

Medications                                                                   

Transportation Arranged       Answers:  Other                         Notes:  [facility]

Transport will Pick (Date     11/20/2018 11:30 AM

& Time)                       

Faxed Final Orders            Answers:  Yes                                   

Agency/Facility Transfer      Answers:  Yes                                   

Report Printed & Faxed to                                                     

Receiving Agency                                                              

Family Notified               Answers:  Yes                                   

Discharge Comments            

Notes:

Pt being transported to [facility].

 

Date Signed:  11/20/2018 10:40 AM

Electronically Signed By:Indiana Yang

## 2018-11-20 NOTE — SOAPPROG
SOAP Progress Note


Assessment/Plan: 


Assessment: POD#6 MVR #27 Magna bioprosthesis, CABG x 1 (SV-dRCA) for IE





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 

after blood cxs cleared. Veg  no org. Await valve cx result. Abx via PICC 

under ID direction. Stable early postop course. Antithrombotic prophylaxis with 

coumadin x 2 mo, target INR 2-3 (today 1.94). Hold today. AF prophylaxis with 

BB - heart rate and rhythm stable. 





Lupus on chronic immunosuppression -  MMF on hold. Plaquenil restarted. 





Single vessel CAD - Incidental finding by preop LHC. Bypassed with venous 

conduit. Secondary prevention w baby ASA, BB. Restart statin when LFTs 

normalize.





Acute expected blood loss anemia with thrombocytopenia and coagulopathy - 

Corrected with 1u Plt, 4u FFP, and 2u PRBC. VTE prophylaxis with SCDs and 

coumadin until INR > 1.7. 1U pRBC for Hct 22 11/17.





C1-C2 prevertebral abscess/Left psoas myositis - improved from previous pre-OR 

scans. Abx per ID





Plan:


Hold coumadin today, repeat coumadin 2.5 mg PO tomorrow; daily INRs 


Awaiting TTE results 


SNF discharge today








Subjective: 





No complaints. Ready for discharge to SNF.


Objective: 





 Vital Signs











Temp Pulse Resp BP Pulse Ox


 


 36.9 C   70   14   131/73 H  99 


 


 11/20/18 07:20  11/20/18 07:20  11/20/18 07:20  11/20/18 07:20  11/20/18 07:20








 Microbiology











 11/14/18 14:25 Gram Stain - Final





 Heart - Tissue 








 Laboratory Results





 11/18/18 05:55 





 11/17/18 05:45 





 











 11/19/18 11/20/18 11/21/18





 05:59 05:59 05:59


 


Intake Total 810 800 


 


Output Total 925 825 


 


Balance -115 -25 








 











PT  22.2 SEC (12.0-15.0)  H  11/20/18  05:50    


 


INR  1.94  (0.83-1.16)  H  11/20/18  05:50    








General: NAD, sitting upright


HEENT: PICC, MMM


Resp:  no wheezes, crackles


Cardiac: NSR, no m/r/g, no edema


GI: soft, nt, nd


Incisions: sternum - clean, dry, intact; EVH left thigh - clean, dry, intact





DVT prophylaxis: SCDs, Coumadin 


Arterial Line: removed


Foster: removed


Antibiotics within 48 hours: yes


CVL: PICC - needed for long term abx


Chest tubes: removed


Wires: removed





ICD10 Worksheet


Patient Problems: 


 Problems











Problem Status Onset


 


Acute blood loss anemia Acute  


 


Endocarditis Acute  


 


Meningitis Acute  


 


S/P CABG x 1 Acute  


 


S/P MVR (mitral valve replacement) Acute

## 2018-11-20 NOTE — PDIAF
- Diagnosis


Diagnosis: Streptococcus anginosis MV endocarditis w C1-2 prevertebral abscess, 

s/pMVR


Code Status: Full Code





- Medication Management


Long Term Antibiotics: ceftriaxone 2gm IV q12h


Long Term Antibiotic Stop Date: 01/03/19


Discharge Medications: electronically signed and located in the Home Medication 

List.


PICC Care - Routine: Yes





- Orders


Services needed: Home Care, Registered Nurse, Master , Physical 

Therapy, Occupational Therapy


Home Care Face to Face: I certify that this patient was under my care and that 

I had the required face-to-face encounter meeting the encounter requirements on 

the discharge day.  My findings support the fact that the patient is homebound 

as defined in


Home Care Face to Face Continued: CMS Chapter 7 Medicare Benefits Manual 30.1.1

, The condition of the patient is such that there exists a normal inability to 

leave home and consequently, leaving home would require a considerable and 

taxing effort.


Isolation Type: None


Oxygen: yes, patient on 2 L pre-op via NC


Diet Recommendation: cardiac -low fat low salt


Diet Texture: Regular Texture Diet, Thin Liquids, Meds Whole w/Liquids


Weigh Patient: daily


Foster: No


Additional Instructions: 


Cardiac Surgery Instructions


Call [facility] cardiac rehab [phone] to enroll in phase 2 classes once 

released from rehab.


Sternal precautions x 4 weeks. Avoid lifting > 10lbs with an outstretched arm. 

Avoid push/pull activities.


Cleanse wounds once daily with soap and water. Avoid underwater immersion (pool

, hot tub, bath) until scabs off.


Okay to leave all wounds open to air. Avoid creams or ointments until scabs off.


Elevate low legs at rest. Avoid prolonged standing or dangling.





Log daily vital signs: weight, resting heart rate, blood pressure, +/- pulse 

oximetry.


Call [facility] for overnight weight gain > 2lbs, weekly gain > 5lbs or 

worsening leg swelling.


Call [facility] for resting heart rate > 120 or < 60 OR for systolic blood 

pressure consistently < 90 or > 160.


Target oxygen saturation > 89%. Adjustments per cardiac rehab.





Please obtain a chest xray prior to surgical appointment. Use requisition form 

attached to appointment card.


   Chest x-rays don't require an appointment.


   Go to the Emergency Room entrance at the [facility] location.


   Sign in at the computer kiosk in the entryway.


   You will be given a number & may sit in the waiting area until called.


   You will be registered and directed to Imaging on the 1st floor.


   This process can take up to an hour.


   Please allow at least 30 min before your appt to get x-ray taken.





Okay to use over-the-counter medications for iron supplementation, bowel 

function or pain.


Consider Tylenol 500-650 mg with meals and before bed. Max daily dose of 

Tylenol 3000 mg.


Avoid nonsteroidal anti-inflammatories (ie. Ibuprofen, advil, motrin, aleve) x 

3 months for interference with beneficial effects of aspirin on graft flow.


Lifelong antibiotic prophylaxis prior to dental, respiratory tract, or skin/

soft tissue procedures.











- Labs/Radiology


CBC w/diff Date: 11/19/18 (Weekly Monday)


CMP Date: 11/19/18 (Weekly Monday)


CRP Date: 11/19/18 (Weekly Monday)


PT/INR Date: 11/21/18 (daily INR, dose coumadin for 2-3)


Call or Fax Lab and Imaging Results to: Carmelo Nieto MD [facility] 

[facility] at fax [phone]





- Follow Up Care


Current Providers and Referrals: 


Carmelo Nieto MD [Medical Doctor] - 11/27/18 11:00 am


MAI SANTIAGO [Primary Care Provider] - As per Instructions


Beau Henry MD [Medical Doctor] - 11/27/18 10:00 am

## 2018-11-20 NOTE — PDHOMEO2F
Home Oxygen Face to Face


Home Orders: 


I certify that a physician or a nurse practitioner or physician's assistant has 

had a face-to-face encounter with this patient on the date of this order due to 

the diagnosis listed, which relates to the primary reason the patient requires 

home oxygen. Alternative treatments have been tried, or considered, and deemed 

ineffective. It is anticipated that supplemental oxygen will result in 

improvement with treatment.





Home oxygen qualifying diagnosis: mitral valve endocarditis s/p replacement, 

CAD s/p CABG


Home oxygen secondary diagnosis: chronic respiratory insufficiency on home 

oxygen (baseline 2 LPM)


SpO2 on room air (%): 86%


Frequency of home oxygen needed: continuous


Home oxygen delivery device: nasal cannula


Concentrator: Yes


E-tanks for mobility and back up: Yes


If ordering portable O2, is the patient mobile in the home?: Yes


I certify that, based on these findings, the home oxygen is medically necessary 

for this patient for the following length of time.





Length of time home oxygen needed: 1 month

## 2018-11-20 NOTE — ASMTLACE
LACE

 

Length of stay for            Answers:  14 days or more                       

current admission                                                             

Acuity / Level of             Answers:  Yes                                   

Care: Did the patient                                                         

have an inpatient                                                             

admission?                                                                    

Comorbidities - select        Answers:  Opioid dependence                     

all that apply                          / Chronic pain                        

                                        Other                         Notes:  Hx of lupus; DVT

# of Emergency department     Answers:  1-2                                   

visits in the last 6                                                          

months                                                                        

Score: 16

 

Date Signed:  11/20/2018 10:38 AM

Electronically Signed By:Indiana Yang

## 2018-11-20 NOTE — PDDCSUM
Discharge Summary


Discharge Summary: 





DATE OF ADMISSION: 11/6/18 via Emergency Department 





DATE OF DISCHARGE: 11/20/18





DISPOSITION: Skilled nursing [facility]





ADMISSION DIAGNOSES: 


1. Possible meningitis 


2. Acute bacteremia with an unknown source


3. Chronic respiratory insufficiency on home oxygen (2L baseline)


4. Systemic Lupus Erythematosus


5. Chronic medication-induced immunosuppression 


6. Hypertension


7. Hyperlipidemia 


8. History of transaminitis  


9. Lyme disease 


10. Thrombocytopenia 





DISCHARGE DIAGNOSES:


As above plus,


1. Streptococcus anginosus mitral valve endocarditis complicated by splenic 

emboli s/p MVReplacement 


2. Coronary artery disease s/p CABGx1 SVG-RCA


3. Left psoas myositis/left hip pain


4. Acute expected blood loss anemia


5. C1-2 prevertebral abscess with CSF pleocytosis 


6. Pulmonary hypertension  





PROCEDURE AND IMAGING PERFORMED IN CHRONOLOGICAL ORDER: 


1. Lumbar puncture, 11/6/18 by Dr. Ronnell Waters


2. MRI Cervical Spine Without & With Contrast, 11/7/18 


3. MRI Left Hip/Pelvis Without & With Contrast, 11/8/18


4. Transthoracic echocardiogram, 11/8/18 


5. Transesophageal echocardiogram, 11/9/18, Dr. Galo Man 


6. Left heart catheterization, coronary angiography, 11/13/18, Dr. Cas Ruiz


7. Mitral valve replacement with 27 mm Magna Ease bovine pericardial valve, 

single-vessel coronary artery bypass grafting with saphenous vein graft from 

aorta to right coronary artery, endoscopic harvest from left thigh, 11/14/18, 

Dr. Beau Priest


8. MRI Cervical Spine Without & With Contrast, 11/19/18


9. MRI Left Hip/Pelvis Without & With Contrast, 11/19/18


10. Transthoracic echocardiogram, 11/18/18, report pending at time of discharge





HISTORY OF PRESENT ILLNESS:


This is a 73-year-old male with a history of lupus, on Plaquenil and 

mycophenolate, who was referred to the ED by his orthopedic surgeon after being 

evaluated for neck pain earlier that day. Patients states that he had been 

having worsening neck pain over the past week. He went to the see his 

orthopedic surgeon, where he had x-rays done that did not reveal a cause for 

his pain. He then developed some pretty significant chills and was sent to the 

emergency department for further evaluation. He denied headache, fever, numbness

, or weakness. In the emergency department, he underwent a lumbar puncture. He 

was admitted for possible meningitis and bacteremia. He was started on empiric 

antibiotics by infectious disease. 





HOSPITAL COURSE:


He was admitted to the hospital and received a full infectious disease work-up 

as to the source of his possible meningitis and bacteremia. MRI of his cervical 

neck demonstrated C1-C2 abscess with mild epidural inflammatory changes causing 

a CSF pleocytosis effectively ruling out meningitis. Further, the CSF 

meningoencephalitis panel returned negative. ENT and neurosurgery were 

consulted who recommended medical therapy for his C1-2 abscess. He was 

transitioned to ceftriaxone. He complained of left hip and pelvic pain as well. 

MRI demonstrated left psoas myositis. TTE was performed but was unable to rule 

out endocarditis. ANAMARIA confirmed mitral valve endocarditis with moderate to 

severe mitral valve regurgitation, pulmonary hypertension, and a preserved 

LVEF. He was initially recommended medical management but then there was 

concern for splenic embolization. Surgery was then recommended with pre-

operative LHC which demonstrated single vessel coronary artery disease. He went 

for surgery on 11/14/18 as listed above. He required low dose dobutamine the 

night surgery and otherwise had a stable post-operative course. He required 

blood transfusions (1 platelet, 4 FFP, 2 PRBC) for expected acute blood loss 

anemia and thrombocytopenia. He was transferred to the progressive care unit. 

Coumadin was started with a target INR or 2-3 for 8 weeks. He is to continue IV 

antibiotics until January under the care of infectious disease. Repeat MRI of 

the cervical neck and hip demonstrate improvement. He is to be discharged to a 

skilled nursing facility for further cares. 





CONSULTANTS: 


1. Infectious Diseases, Dr. Carmelo Nieto & Dr. Carina Diaz


2. Otorhinolaryngology, Dr. Daquan Mederos 


3. Neurosurgery, Dr. Sunday Dial


4. Cardiothoracic Surgery, Dr. Beau Priest 


5. Hospital Medicine, Dr. Shahbaz Hooper, Dr. Richard King, Dr. Rashaad Hunt 


6. Cardiology, Dr. Galo Man & Dr. Cas Ruiz 





MEDICATIONS ON ADMISSION:


1. Aspirin 81 mg PO daily


2. Mycophenolate Mofetil 1,000 mg PO BID


3. Plaquenil 200 mg PO BID


4. Liptor 40 mg PO HS


5. Norvasc 5 mg PO daily


6. Doxycycline 50 mg PO BID


7. Ramipril 5 mg PO daily 





ALLERGIES/SENSITIVITIES: Contrast dye





DISCHARGE MEDICATIONS:


STOP these medications:


1. Liptor 40 mg PO HS (d/t elevated LFTs)


2. Norvasc 5 mg PO daily


3. Ramipril 5 mg PO daily


4. Mycophenolate Mofetil 1,000 mg PO BID (restart when okay with ID)





CONTINUE these medications:


1. Aspirin 81 mg PO daily


2. Plaquenil 200 mg PO BID


3. Doxycycline 50 mg PO BID





NEW medications:


1. Coumadin 2.5 mg PO daily for a target INR 2-3 for 8 weeks.


2. Metoprolol Tartrate 25 mg PO BID


3. Tramadol 50 mg PO q6hrs PRN pain


4. Clotrimazole 10 mg PO 5XD saurabh for 12 days  


5. Ceftriaxone 2 gm IV q12 per ID


6. Benadryl 25 mg PO q6hrs prn itching 


7. Tylenol OTC





DISCHARGE CLINICAL INFORMATION:


Sternum grossly stable. Sternotomy CDI, sutured, +Dermabond. EVH left thigh CDI


HR 70.  /73. SpO2 99% 2L (chronic) . Euvolemic 


WBC 8.69, Hgb 9.3, HCT 27.5, Plt 129 , Na 135 , K 3.8, Cr 0.7 INR 1.94





FOLLOW UP APPOINTMENTS:


1. CV surgery: with Dr. Priest at [facility] on 11/27/18 @ 10 AM.


2. Cardiology: with Dr. Galo Man/Dr. Cas Ruiz at [facility] within 4

-6 weeks. Appointment to be established during surgical visit.


3. Follow up with your primary care physician in 4 weeks Dr. Lee Harrell.


4. Follow up with Infectious Diseases Dr. Carmelo Nieto 11/27/18 @ 11 AM





FOLLOW UP TESTING:


CXR prior to surgical appointment.


Labs CBC, CMP, CRP ordered by ID

## 2018-11-22 NOTE — ASDISCHSUM
----------------------------------------------

Discharge Information

----------------------------------------------

Plan Status:SNF                                      Medically Cleared to Leave:11/19/2018

Discharge Date:11/20/2018 11:50 AM                    D/C Disposition:Skilled Nursing Facility

ADT D/C Disposition:Skilled Nursing Facility         Projected Discharge Date:11/19/2018 11:00 AM

Transportation at D/C:Wheelchair Van                 Discharge Delay Reason:

Follow-Up Date:11/19/2018 11:00 AM                   Discharge Slot:

Final Diagnosis:

----------------------------------------------

Placement Information

----------------------------------------------

Referral Type:Home Infusion                          Referral ID:[id number]

Provider Name:

Address 1:                                           Phone Number:

Address 2:                                           Fax Number:

City:                                                Selection Factors:

State:

 

Referral Type:*Home Health Care Services             Referral ID:[facility]-[id number]

Provider Name:

Address 1:                                           Phone Number:

Address 2:                                           Fax Number:

City:                                                Selection Factors:

State:

 

Referral Type:*Nursing Home/SNF                      Referral ID:[id number]

Provider Name:[facility]

Address 1:[address]                    Phone [phone]

Address 2:                                           Fax [phone]

City:[place]                                      Selection Factors:

State:[place]

 

----------------------------------------------

Patient Contact Information

----------------------------------------------

Contact Name:MOOKIE                       Relationship:Wife

Address:[address]                         Home [phone]

                                                     Work Phone:

City:[place]                                       [facility] Phone:

[facility]/Zip Code:[place] [address]                              Email:

----------------------------------------------

Financial Information

----------------------------------------------

Financial Class:[employer]

[facility]       Primary Plan Number:[id number]

Secondary Plan Desc:                                 Secondary Plan Number:

 

 

----------------------------------------------

Assessment Information

----------------------------------------------

----------------------------------------------

LACE

----------------------------------------------

LACE

 

Length of stay for            Answers:  14 days or more                       

current admission                                                             

Acuity / Level of             Answers:  Yes                                   

Care: Did the patient                                                         

have an inpatient                                                             

admission?                                                                    

Comorbidities - select        Answers:  Opioid dependence                     

all that apply                          / Chronic pain                        

                                        Other                         Notes:  Hx of lupus; DVT

# of Emergency department     Answers:  1-2                                   

visits in the last 6                                                          

months                                                                        

Score: 16

 

Date Signed:  11/20/2018 10:38 AM

Electronically Signed By:Indiana Yang

 

 

----------------------------------------------

[facility] CM Progress Note

----------------------------------------------

CM Note

 

CM Note                       

Notes:

Pt is a 74 y/o man admitted for meningitis. Pt has a hx of lupus, plaquenil and mycophenolate. ID 

has been consulted. Needs are TBD at this time. CM to follow.







Plan: TBD

 

Date Signed:  11/07/2018 12:15 PM

Electronically Signed By:PATRICE Sheldon

 

 

----------------------------------------------

[facility] CM Progress Note

----------------------------------------------

CM Note

 

CM Note                       

Notes:

Pt lives independently at his home with his wife, Lisa #[phone].  He is an  at [employer]. Antibiotics are presently PO.  No CM needs anticipated.  CM will follow for changes.



D/C plan:  Anticipate independent.

 

Date Signed:  11/08/2018 04:57 PM

Electronically Signed By:Indiana Yang

 

 

----------------------------------------------

[facility] CM Progress Note

----------------------------------------------

CM Note

 

CM Note                       

Notes:

Pt had cardiology work up and has vegetation on mitral valve. Sounds like they will take a medical 

approach 1st. Pt still considered independent and will dc home w/support of wife when medically 

stable. CM available for any changes.



DC Plan: Independent

 

Date Signed:  11/09/2018 03:14 PM

Electronically Signed By:Jeimy Colon RN

 

 

----------------------------------------------

[facility] CM Progress Note

----------------------------------------------

CM Note

 

CM Note                       

Notes:

Spoke w/MD, pt will need IV abx at [place], may go home tomorrow. Referral sent to [facility], pt is 

otherwise independent, [facility] w/f.



[place] [facility]

 

Date Signed:  11/20/2018 10:38 AM

Electronically Signed By:Indiana Yang

 

 

----------------------------------------------

[facility] CM Progress Note

----------------------------------------------

CM Note

 

CM Note                       

Notes:

CM spoke to Dr. Hunt regarding d/c POC. Pt will require ivabx at time of d/c. Referral made to 

[facility] and [facility]. Both are able to accept. Estelle from [facility] met w/ pt today. CM to follow.







Plan: [facility]/ [facility]; RN

 

Date Signed:  11/12/2018 11:22 AM

Electronically Signed By:PATRICE Sheldon

 

 

----------------------------------------------

[facility] CM Progress Note

----------------------------------------------

CM Note

 

CM Note                       

Notes:

Pt admitted for suspected meningitis, bacteremia and cervical abscess in the setting of lupus on 

immunosupressive drugs. Pt then had open heart surgery on 11/14 for mitral valve repair and 

CABG. Pt to transfer to PCU likely today. ID following; pt on IV ABX which will likely continue 

after discharge. Referrals have been sent to [facility] and [facility] and have been accepted. PT and OT 

[place] pending. Pt lives in [place] with his wife Izabella. CM to follow. 



D/C Plan: Home infusion with [facility] and [facility] v SNF pending PT/OT evals

 

Date Signed:  11/15/2018 11:25 AM

Electronically Signed By:Jessica Almodovar

 

 

----------------------------------------------

[facility] Progress Note

----------------------------------------------

CM Note

 

CM Note                       

Notes:

I met with patient and wife. Wife is concerned about her ability to care for patient when he is 

discharged. We talked about SNF rehab v hiring private duty care at home. She will visit [facility] 

and [facility] today and let us know if that is what she wants. I sent referrals to both 

facilities; they will have to request insurance authorization from [facility] if patient chooses to 

admit. We will follow up with patient and wife first thing tomorrow since CT surgery is eager to 

discharge. 

 

Date Signed:  11/18/2018 01:45 PM

Electronically Signed By:Amanda Lanier RN

 

 

----------------------------------------------

[facility] Progress Note

----------------------------------------------

CM Note

 

CM Note                       

Notes:

D/W PA, patient appropriate for SNF at this time.  Met with patient and spoke with patient's wife 

via phone #[phone], they have chosen [facility], [facility] okay with this SNF 

plan.  Updated therapy notes sent to [facility] for Barb requesting they start insurance 

auth.  CM will follow.



Plan:  [facility] when auth rec'd.

 

Date Signed:  11/19/2018 12:12 PM

Electronically Signed By:Lolis Lima RN

 

 

----------------------------------------------

[facility] CM Progress Note

----------------------------------------------

CM Note

 

CM Note                       

Notes:

CM spoke with Barb at [facility]. Auth rec'd. Notified PA, will plan for D/C on Tuesday. Shanika rachel 

[facility] updated on pt's discharge plan. CM will follow.



Plan: [facility] on Tuesday.

 

Date Signed:  11/19/2018 02:53 PM

Electronically Signed By:Lolis Lima RN

 

 

----------------------------------------------

Case Management Discharge Plan Note

----------------------------------------------

Case Management Discharge

 

Discharge Order Complete?     Answers:  Yes                                   

Patient to Obtain             Answers:  Other                         Notes:  [facility]

Medications                                                                   

Transportation Arranged       Answers:  Other                         Notes:  [facility]

Transport will Pick (Date     11/20/2018 11:30 AM

& Time)                       

Faxed Final Orders            Answers:  Yes                                   

Agency/Facility Transfer      Answers:  Yes                                   

Report Printed & Faxed to                                                     

Receiving Agency                                                              

Family Notified               Answers:  Yes                                   

Discharge Comments            

Notes:

Pt being transported to [facility].

 

Date Signed:  11/20/2018 10:40 AM

Electronically Signed By:Indiana Yang

 

 

----------------------------------------------

Intervention Information

----------------------------------------------

## 2018-11-23 NOTE — ECHO
[email or web address]





[address] [address] 

Main: [phone] 



Fax: 



Transthoracic Echocardiogram 

Name:             TYRELL GREEN                    MR#:

[id number]

Study Date:       2018                           Study Time:

09:00 AM

YOB: 1945                           Age:

73 year(s)

Height:           175.3 cm (69 in.)                    Weight:

78.02 kg (172 lb.)

BSA:              1.94 m2                              Gender:

Male

Examination:      Echo                                 Indication:

baseline postop echo, s/p MVR #27 CE



magna bioprosthesis 

Image Quality:    Adequate                             Contrast: 

Requested by:     Saskia Gage                             BP:

/

Heart Rate:                                            Rhythm: 

Indication:       baseline postop echo, s/p MVR #27 CE magna

bioprosthesis



Procedure Staff 

Ultrasound Technician:   Debbie Sabillon RDCS 

Reading Physician:       Jarad Mancuso MD 

Requesting Provider: 



Conclusions:           1)Technically limited echo. 

2)Normal LV size and systolic function with a LVEF of 58% and

paradoxical septal motion consistent

with previous cardiac surgery. 

3)Mild-moderate concentric LVH with diastolic dysfunction noted. 

4)Mild to moderate left atrial enlargement noted. 

5)Aortic valve sclerosis with trivial AI and no AS. 

6)Bioprosthetic MVR with normal gradient and trivial to mild MR.

Fibrinous material seen on MV but

no obvious vegatations. 

7)Mild to moderate TR with estimated normal PA pressures. 

8)Trivial circumferential pericardial effusion with no tamponade. 



Measurements: 

Chambers                     Valvular Assessment AV/MV

Valvular Assessment TV/PV



Normal                                    Normal

Normal

Name         Value      Range              Name         Value Range

Name           Value Range

Ao Heidi (2D): 3.6 cm     (1.4 cm-2.6            AV Vmax:     1.68 m/s

(1 m/s-1.7        TR Vmax:       2.61 mm/s ( - )



cm)                                   m/s)             TR PGmax:

27 mmHg ( - )

IVSd (2D):   1.1 cm (0.6 cm-1.1                AV maxP mmHg (

- )          syst. PAP: 32 mmHg   ( - )



cm)                AV meanP mmHg ( - )           PV Vmax:

0.88 m/s (0.6 m/s-0.9

LVDd (2D):   4.1 cm     (4.2 cm-5.9            ADITI (VTI):   2.4 cm ( -

)                                m/s)



cm)                MV E Vmax:   1.54 m/s ( - )         PV PGmax:

3  mmHg ( - )

LVDs (2D):   2.6 cm     (2.1 cm-4              MV A Vmax:   1.08 m/s (

- )



cm)                MV E/A:      1.43  ( - )  

LVPWd (2D):  1.3 cm     (0.6 cm-1 



cm)                    MV meanP mmHg ( - )  

LVOTd        2.1 cm     2.1 cm mm              MV PHT:      0.091 s (

- )



LVEF (BP):   58 %       (>=55 %)               MVA (Vmax):  1.2 m/s (

- )

RVDd(2D):    3.2 cm     (1.9 cm-3.8            MVA (PHT):   2.4 s ( -

)



cmmm)  



Patient: TYRELL GREEN                    MRN: [id number]

Study Date: 2018   Page 1 of 2

09:00 AM 









Continued Measurements: 

Chambers                      Valvular Assessment AV/MV

Valvular Assessment TV/PV



Name                       Value           Name

Value     Name                      Value

LADs:                   3.8 cm                 MV DecTime:

327 m/s      CVP (est.):             5 mmHg

LADs Lon.4 cm                 MV E/E' Lateral:

20.00

LA Area:                24.0 cm2           MV VTI:

54.60 cm

LA Volume:              80 ml   

LA Volume Index:        41.2 ml/m2   

RA Area:                17.9 cm2   



Additional Vessels  



Name                       Value  

Ao Ascending:           3.6 cm    

Inferior Vena Cava:     1.9 cm    



Findings:              Left Ventricle: 

Normal size left ventricle. Mild concentric LV hypertrophy. Normal

global systolic LV function. EF is

58 %. No regional wall motion abnormality. Diastolic dysfunction is

present. .

Right Ventricle: 

Normal size right ventricle. Normal RV function.  

Left Atrium: 

The left atrium is mildly to moderately dilated.  

Right Atrium: 

The right atrium is normal in size.  

Mitral Valve: 

A bioprothetic mitral valve is in place. The mitral valve prosthesis

exhibits normal function. The

prosthetic mitral valve is normal. Prosthetic mitral valve orifice

motion is normal. Mild MV prosthesis

regurgitation. #27 CE magna.  

Aortic Valve: 

The aortic valve is tri-leaflet. Aortic sclerosis is present. Trivial

aortic valve regurgitation. No aortic

valve stenosis is present.  

Tricuspid Valve: 

The tricuspid valve is normal in appearance and function. Mild to

moderate tricuspid valve

regurgitation. The pulmonary artery pressure is normal. Right

ventricular systolic pressure measures

32mmHg.  

Pulmonic Valve: 

The pulmonic valve is normal in appearance and function. Trivial

pulmonic valve regurgitation.

Aorta: 

The aorta is normal. Normal size aortic root measuring 4.0 cm,

measuring 3.6 cm. Normal size

ascending aorta measuring 3.6 cm.  

IVC: 

The IVC is normal sized.  

Pericardium: 

Trivial pericardial effusion. No pleural effusion.  

Exam Comments: 

First part of echo performed by Ramirez . Imaging on 2018 is

labeled.







Electronically signed by Jarad Mancuso MD on 2018 at 10:32 AM 

(No Signature Object) 



Patient: TYRELL GREEN                    MRN: [id number]

Study Date: 2018   Page 2 of 2

09:00 AM 







[id number]

## 2018-11-27 ENCOUNTER — HOSPITAL ENCOUNTER (OUTPATIENT)
Dept: HOSPITAL 80 - FIMAGING | Age: 73
End: 2018-11-27
Attending: THORACIC SURGERY (CARDIOTHORACIC VASCULAR SURGERY)
Payer: COMMERCIAL

## 2018-11-27 DIAGNOSIS — Z95.1: ICD-10-CM

## 2018-11-27 DIAGNOSIS — M47.894: ICD-10-CM

## 2018-11-27 DIAGNOSIS — I51.7: ICD-10-CM

## 2018-11-27 DIAGNOSIS — J84.10: ICD-10-CM

## 2018-11-27 DIAGNOSIS — Z95.2: ICD-10-CM

## 2018-11-27 DIAGNOSIS — J81.1: ICD-10-CM

## 2018-11-27 DIAGNOSIS — Z48.812: Primary | ICD-10-CM

## 2018-12-20 ENCOUNTER — HOSPITAL ENCOUNTER (OUTPATIENT)
Dept: HOSPITAL 80 - FIMAGING | Age: 73
End: 2018-12-20
Attending: INTERNAL MEDICINE
Payer: COMMERCIAL

## 2018-12-20 DIAGNOSIS — R60.9: ICD-10-CM

## 2018-12-20 DIAGNOSIS — M51.36: ICD-10-CM

## 2018-12-20 DIAGNOSIS — M53.86: ICD-10-CM

## 2018-12-20 DIAGNOSIS — M60.9: Primary | ICD-10-CM

## 2018-12-20 PROCEDURE — A9585 GADOBUTROL INJECTION: HCPCS

## 2019-01-02 ENCOUNTER — HOSPITAL ENCOUNTER (OUTPATIENT)
Dept: HOSPITAL 80 - FIMAGING | Age: 74
End: 2019-01-02
Attending: INTERNAL MEDICINE
Payer: COMMERCIAL

## 2019-01-02 DIAGNOSIS — G06.2: Primary | ICD-10-CM

## 2019-01-02 DIAGNOSIS — M50.30: ICD-10-CM

## 2019-01-02 PROCEDURE — A9585 GADOBUTROL INJECTION: HCPCS

## 2019-03-22 ENCOUNTER — HOSPITAL ENCOUNTER (OUTPATIENT)
Dept: HOSPITAL 80 - FIMAGING | Age: 74
End: 2019-03-22
Attending: INTERNAL MEDICINE
Payer: COMMERCIAL

## 2019-03-22 DIAGNOSIS — R22.42: Primary | ICD-10-CM
